# Patient Record
Sex: MALE | Race: ASIAN | Employment: FULL TIME | URBAN - METROPOLITAN AREA
[De-identification: names, ages, dates, MRNs, and addresses within clinical notes are randomized per-mention and may not be internally consistent; named-entity substitution may affect disease eponyms.]

---

## 2017-05-14 ENCOUNTER — HOSPITAL ENCOUNTER (INPATIENT)
Facility: CLINIC | Age: 57
LOS: 5 days | Discharge: HOME | DRG: 897 | End: 2017-05-19
Attending: EMERGENCY MEDICINE | Admitting: FAMILY MEDICINE
Payer: COMMERCIAL

## 2017-05-14 DIAGNOSIS — F10.239 ALCOHOL DEPENDENCE WITH WITHDRAWAL WITH COMPLICATION (H): ICD-10-CM

## 2017-05-14 PROBLEM — F19.20 CHEMICAL DEPENDENCY (H): Status: ACTIVE | Noted: 2017-05-14

## 2017-05-14 LAB
ALBUMIN SERPL-MCNC: 3.6 G/DL (ref 3.4–5)
ALBUMIN UR-MCNC: NEGATIVE MG/DL
ALP SERPL-CCNC: 123 U/L (ref 40–150)
ALT SERPL W P-5'-P-CCNC: 48 U/L (ref 0–70)
AMPHETAMINES UR QL SCN: ABNORMAL
ANION GAP SERPL CALCULATED.3IONS-SCNC: 10 MMOL/L (ref 3–14)
APPEARANCE UR: CLEAR
AST SERPL W P-5'-P-CCNC: 74 U/L (ref 0–45)
BARBITURATES UR QL: ABNORMAL
BASOPHILS # BLD AUTO: 0 10E9/L (ref 0–0.2)
BASOPHILS NFR BLD AUTO: 0.4 %
BENZODIAZ UR QL: ABNORMAL
BILIRUB SERPL-MCNC: 0.6 MG/DL (ref 0.2–1.3)
BILIRUB UR QL STRIP: NEGATIVE
BUN SERPL-MCNC: 7 MG/DL (ref 7–30)
CALCIUM SERPL-MCNC: 8.5 MG/DL (ref 8.5–10.1)
CANNABINOIDS UR QL SCN: ABNORMAL
CHLORIDE SERPL-SCNC: 104 MMOL/L (ref 94–109)
CO2 SERPL-SCNC: 26 MMOL/L (ref 20–32)
COCAINE UR QL: ABNORMAL
COLOR UR AUTO: ABNORMAL
CREAT SERPL-MCNC: 0.76 MG/DL (ref 0.66–1.25)
DIFFERENTIAL METHOD BLD: NORMAL
EOSINOPHIL # BLD AUTO: 0.1 10E9/L (ref 0–0.7)
EOSINOPHIL NFR BLD AUTO: 0.9 %
ERYTHROCYTE [DISTWIDTH] IN BLOOD BY AUTOMATED COUNT: 13.6 % (ref 10–15)
ETHANOL SERPL-MCNC: 0.13 G/DL
ETHANOL UR QL SCN: ABNORMAL
GFR SERPL CREATININE-BSD FRML MDRD: ABNORMAL ML/MIN/1.7M2
GGT SERPL-CCNC: 49 U/L (ref 0–75)
GLUCOSE SERPL-MCNC: 101 MG/DL (ref 70–99)
GLUCOSE UR STRIP-MCNC: NEGATIVE MG/DL
HCT VFR BLD AUTO: 41.5 % (ref 40–53)
HGB BLD-MCNC: 13.9 G/DL (ref 13.3–17.7)
HGB UR QL STRIP: ABNORMAL
IMM GRANULOCYTES # BLD: 0 10E9/L (ref 0–0.4)
IMM GRANULOCYTES NFR BLD: 0.1 %
KETONES UR STRIP-MCNC: 5 MG/DL
LACTATE BLD-SCNC: 3.5 MMOL/L (ref 0.7–2.1)
LEUKOCYTE ESTERASE UR QL STRIP: NEGATIVE
LIPASE SERPL-CCNC: 131 U/L (ref 73–393)
LYMPHOCYTES # BLD AUTO: 2 10E9/L (ref 0.8–5.3)
LYMPHOCYTES NFR BLD AUTO: 24.1 %
MAGNESIUM SERPL-MCNC: 2 MG/DL (ref 1.6–2.3)
MCH RBC QN AUTO: 31 PG (ref 26.5–33)
MCHC RBC AUTO-ENTMCNC: 33.5 G/DL (ref 31.5–36.5)
MCV RBC AUTO: 92 FL (ref 78–100)
MONOCYTES # BLD AUTO: 0.4 10E9/L (ref 0–1.3)
MONOCYTES NFR BLD AUTO: 5.1 %
MUCOUS THREADS #/AREA URNS LPF: PRESENT /LPF
NEUTROPHILS # BLD AUTO: 5.7 10E9/L (ref 1.6–8.3)
NEUTROPHILS NFR BLD AUTO: 69.4 %
NITRATE UR QL: NEGATIVE
NRBC # BLD AUTO: 0 10*3/UL
NRBC BLD AUTO-RTO: 0 /100
OPIATES UR QL SCN: ABNORMAL
PH UR STRIP: 7 PH (ref 5–7)
PLATELET # BLD AUTO: 187 10E9/L (ref 150–450)
POTASSIUM SERPL-SCNC: 3.2 MMOL/L (ref 3.4–5.3)
PROT SERPL-MCNC: 8.2 G/DL (ref 6.8–8.8)
RBC # BLD AUTO: 4.49 10E12/L (ref 4.4–5.9)
RBC #/AREA URNS AUTO: 1 /HPF (ref 0–2)
SODIUM SERPL-SCNC: 140 MMOL/L (ref 133–144)
SP GR UR STRIP: 1.01 (ref 1–1.03)
URN SPEC COLLECT METH UR: ABNORMAL
UROBILINOGEN UR STRIP-MCNC: NORMAL MG/DL (ref 0–2)
WBC # BLD AUTO: 8.2 10E9/L (ref 4–11)
WBC #/AREA URNS AUTO: 1 /HPF (ref 0–2)

## 2017-05-14 PROCEDURE — 80320 DRUG SCREEN QUANTALCOHOLS: CPT | Performed by: EMERGENCY MEDICINE

## 2017-05-14 PROCEDURE — 25000132 ZZH RX MED GY IP 250 OP 250 PS 637: Performed by: EMERGENCY MEDICINE

## 2017-05-14 PROCEDURE — 25000132 ZZH RX MED GY IP 250 OP 250 PS 637: Performed by: DERMATOLOGY

## 2017-05-14 PROCEDURE — 83605 ASSAY OF LACTIC ACID: CPT | Performed by: EMERGENCY MEDICINE

## 2017-05-14 PROCEDURE — 85025 COMPLETE CBC W/AUTO DIFF WBC: CPT | Performed by: EMERGENCY MEDICINE

## 2017-05-14 PROCEDURE — HZ2ZZZZ DETOXIFICATION SERVICES FOR SUBSTANCE ABUSE TREATMENT: ICD-10-PCS | Performed by: FAMILY MEDICINE

## 2017-05-14 PROCEDURE — 99292 CRITICAL CARE ADDL 30 MIN: CPT | Mod: Z6 | Performed by: EMERGENCY MEDICINE

## 2017-05-14 PROCEDURE — 25000128 H RX IP 250 OP 636: Performed by: EMERGENCY MEDICINE

## 2017-05-14 PROCEDURE — 99291 CRITICAL CARE FIRST HOUR: CPT | Mod: Z6 | Performed by: EMERGENCY MEDICINE

## 2017-05-14 PROCEDURE — 81001 URINALYSIS AUTO W/SCOPE: CPT | Performed by: EMERGENCY MEDICINE

## 2017-05-14 PROCEDURE — 83690 ASSAY OF LIPASE: CPT | Performed by: EMERGENCY MEDICINE

## 2017-05-14 PROCEDURE — 80053 COMPREHEN METABOLIC PANEL: CPT | Performed by: EMERGENCY MEDICINE

## 2017-05-14 PROCEDURE — 99285 EMERGENCY DEPT VISIT HI MDM: CPT | Mod: 25 | Performed by: EMERGENCY MEDICINE

## 2017-05-14 PROCEDURE — 96360 HYDRATION IV INFUSION INIT: CPT | Performed by: EMERGENCY MEDICINE

## 2017-05-14 PROCEDURE — 96361 HYDRATE IV INFUSION ADD-ON: CPT | Performed by: EMERGENCY MEDICINE

## 2017-05-14 PROCEDURE — 82977 ASSAY OF GGT: CPT | Performed by: EMERGENCY MEDICINE

## 2017-05-14 PROCEDURE — 12800008 ZZH R&B CD ADULT

## 2017-05-14 PROCEDURE — 83735 ASSAY OF MAGNESIUM: CPT | Performed by: EMERGENCY MEDICINE

## 2017-05-14 PROCEDURE — 80307 DRUG TEST PRSMV CHEM ANLYZR: CPT | Performed by: EMERGENCY MEDICINE

## 2017-05-14 PROCEDURE — 25000132 ZZH RX MED GY IP 250 OP 250 PS 637: Performed by: PSYCHIATRY & NEUROLOGY

## 2017-05-14 PROCEDURE — 25000125 ZZHC RX 250: Performed by: EMERGENCY MEDICINE

## 2017-05-14 RX ORDER — HYDRALAZINE HCL 10 MG
10 TABLET ORAL EVERY 6 HOURS PRN
Status: DISCONTINUED | OUTPATIENT
Start: 2017-05-14 | End: 2017-05-19 | Stop reason: HOSPADM

## 2017-05-14 RX ORDER — FOLIC ACID 1 MG/1
1 TABLET ORAL DAILY
Status: DISCONTINUED | OUTPATIENT
Start: 2017-05-15 | End: 2017-05-19 | Stop reason: HOSPADM

## 2017-05-14 RX ORDER — LANOLIN ALCOHOL/MO/W.PET/CERES
100 CREAM (GRAM) TOPICAL DAILY
Status: COMPLETED | OUTPATIENT
Start: 2017-05-15 | End: 2017-05-17

## 2017-05-14 RX ORDER — HYDROXYZINE HYDROCHLORIDE 25 MG/1
25-50 TABLET, FILM COATED ORAL EVERY 4 HOURS PRN
Status: DISCONTINUED | OUTPATIENT
Start: 2017-05-14 | End: 2017-05-19 | Stop reason: HOSPADM

## 2017-05-14 RX ORDER — TRAZODONE HYDROCHLORIDE 50 MG/1
50 TABLET, FILM COATED ORAL
Status: DISCONTINUED | OUTPATIENT
Start: 2017-05-14 | End: 2017-05-19 | Stop reason: HOSPADM

## 2017-05-14 RX ORDER — BISACODYL 10 MG
10 SUPPOSITORY, RECTAL RECTAL DAILY PRN
Status: DISCONTINUED | OUTPATIENT
Start: 2017-05-14 | End: 2017-05-19 | Stop reason: HOSPADM

## 2017-05-14 RX ORDER — POTASSIUM CHLORIDE 1.5 G/1.58G
40 POWDER, FOR SOLUTION ORAL ONCE
Status: COMPLETED | OUTPATIENT
Start: 2017-05-14 | End: 2017-05-14

## 2017-05-14 RX ORDER — ACETAMINOPHEN 325 MG/1
650 TABLET ORAL EVERY 4 HOURS PRN
Status: DISCONTINUED | OUTPATIENT
Start: 2017-05-14 | End: 2017-05-19 | Stop reason: HOSPADM

## 2017-05-14 RX ORDER — DIAZEPAM 5 MG
5-20 TABLET ORAL EVERY 30 MIN PRN
Status: DISCONTINUED | OUTPATIENT
Start: 2017-05-14 | End: 2017-05-19 | Stop reason: HOSPADM

## 2017-05-14 RX ORDER — LORAZEPAM 0.5 MG/1
2 TABLET ORAL ONCE
Status: COMPLETED | OUTPATIENT
Start: 2017-05-14 | End: 2017-05-14

## 2017-05-14 RX ORDER — MULTIPLE VITAMINS W/ MINERALS TAB 9MG-400MCG
1 TAB ORAL DAILY
Status: DISCONTINUED | OUTPATIENT
Start: 2017-05-15 | End: 2017-05-19 | Stop reason: HOSPADM

## 2017-05-14 RX ORDER — ALUMINA, MAGNESIA, AND SIMETHICONE 2400; 2400; 240 MG/30ML; MG/30ML; MG/30ML
30 SUSPENSION ORAL EVERY 4 HOURS PRN
Status: DISCONTINUED | OUTPATIENT
Start: 2017-05-14 | End: 2017-05-19 | Stop reason: HOSPADM

## 2017-05-14 RX ORDER — LOPERAMIDE HCL 2 MG
2 CAPSULE ORAL 4 TIMES DAILY PRN
Status: DISCONTINUED | OUTPATIENT
Start: 2017-05-14 | End: 2017-05-19 | Stop reason: HOSPADM

## 2017-05-14 RX ORDER — SODIUM CHLORIDE 9 MG/ML
1000 INJECTION, SOLUTION INTRAVENOUS CONTINUOUS
Status: DISCONTINUED | OUTPATIENT
Start: 2017-05-14 | End: 2017-05-14

## 2017-05-14 RX ADMIN — SODIUM CHLORIDE 1000 ML: 900 INJECTION, SOLUTION INTRAVENOUS at 08:55

## 2017-05-14 RX ADMIN — DIAZEPAM 10 MG: 5 TABLET ORAL at 15:21

## 2017-05-14 RX ADMIN — POTASSIUM CHLORIDE 40 MEQ: 1.5 POWDER, FOR SOLUTION ORAL at 11:02

## 2017-05-14 RX ADMIN — LORAZEPAM 2 MG: 0.5 TABLET ORAL at 10:55

## 2017-05-14 RX ADMIN — SODIUM CHLORIDE 1000 ML: 900 INJECTION, SOLUTION INTRAVENOUS at 11:58

## 2017-05-14 RX ADMIN — SODIUM CHLORIDE 1000 ML: 900 INJECTION, SOLUTION INTRAVENOUS at 10:24

## 2017-05-14 RX ADMIN — Medication 10 MG: at 21:47

## 2017-05-14 RX ADMIN — FOLIC ACID: 5 INJECTION, SOLUTION INTRAMUSCULAR; INTRAVENOUS; SUBCUTANEOUS at 11:02

## 2017-05-14 RX ADMIN — DIAZEPAM 10 MG: 5 TABLET ORAL at 20:09

## 2017-05-14 RX ADMIN — LORAZEPAM 2 MG: 0.5 TABLET ORAL at 09:29

## 2017-05-14 ASSESSMENT — ENCOUNTER SYMPTOMS
COLOR CHANGE: 0
PALPITATIONS: 1
NAUSEA: 1
ARTHRALGIAS: 0
DIFFICULTY URINATING: 0
TREMORS: 1
SHORTNESS OF BREATH: 0
HEADACHES: 0
NECK STIFFNESS: 0
FEVER: 0
EYE REDNESS: 0
ABDOMINAL PAIN: 0
CONFUSION: 0

## 2017-05-14 ASSESSMENT — ACTIVITIES OF DAILY LIVING (ADL)
GROOMING: INDEPENDENT
ORAL_HYGIENE: INDEPENDENT
DRESS: INDEPENDENT
GROOMING: INDEPENDENT

## 2017-05-14 NOTE — IP AVS SNAPSHOT
" [facility]: [phone]                                              INTERAGENCY TRANSFER FORM - LAB / IMAGING / EKG / EMG RESULTS   2017                    Hospital Admission Date: 2017  BOUCHRA NORWOOD   : 1960  Sex: Male        Attending Provider: Immanuel Car MD     Allergies:  No Known Allergies    Infection:  None   Service:  CHEMICAL DEP    Ht:  1.702 m (5' 7\")   Wt:  72.6 kg (160 lb)   Admission Wt:  72.6 kg (160 lb)    BMI:  25.06 kg/m 2   BSA:  1.85 m 2            Patient PCP Information     Provider PCP Type    Physician No Ref-Primary General      Unresulted Labs     None      Encounter-Level Documents:     There are no encounter-level documents.      Order-Level Documents:     There are no order-level documents.      "

## 2017-05-14 NOTE — ED NOTES
"ED TRIAGE    Medical / Trauma C/o:  56-yr male patient - presenting to ED via EMS ([facility]); from his residence; exhibiting S/s of ETOH w/d - d/t's:  Tachy, HTN, nausea, tremulous, thirst/dehydration, anxiety.  States his last ETOH:  Two days ago.  English is limited.    Duration of C/o:  Since yesterday    Contributing Factors / Concerning HX:  Previous ETOH issues    Significant Med's / Tx's:  See med's    Febrile / Afebrile:  Afebrile    Patient Vitals for the past 24 hrs:   BP Temp Temp src Pulse Heart Rate Resp SpO2 Height Weight   05/14/17 0734 (!) 164/100 98.8  F (37.1  C) Oral 108 108 22 100 % 1.702 m (5' 7\") 72.6 kg (160 lb)       Raul Skinner  May 14, 2017  7:53 AM    "

## 2017-05-14 NOTE — PROGRESS NOTES
05/14/17 1427   Patient Belongings   Did you bring any home meds/supplements to the hospital?  No   Patient Belongings clothing   Disposition of Belongings see note   Belongings Search Yes     Shoes, coat in storage    Lighter, smokeless tobacco, ear buds in sharps    Cell, , wallet, waist belt, 2 necklaces in locked drawer    $8.00 cash, 2 medical cards, Visa in security    ADMISSION:  I am responsible for any personal items that are not sent to the safe or pharmacy. [facility] is not responsible for loss, theft or damage of any property in my possession.    Patient Signature _____________________ Date/Time _____________________    Staff Signature _______________________ Date/Time _____________________    2nd Staff person, if patient is unable/unwilling to sign  ___________________________________ Date/Time _____________________  DISCHARGE:  All personal items have been returned to me.    Patient Signature _____________________ Date/Time _____________________    Staff Signature _______________________ Date/Time _____________________

## 2017-05-14 NOTE — ED PROVIDER NOTES
History     Chief Complaint   Patient presents with     Delirium Tremens (DTS)     ETOH w/d; last ETOH:  2 days ago     BEATRIZ Chou is a 56 year old male with a history of alcohol abuse who presents to the Emergency Department via EMS for evaluation of an addiction problem. The patient reports drinking whiskey daily with his last drink last night. In the mornings, he does develop tremors, which he developed today as well as palpitations, myalgias and nausea, prompting him to call EMS. He denies any other concerns or complaints at this time.    History reviewed. No pertinent past medical history.    History reviewed. No pertinent surgical history.    No family history on file.    Social History   Substance Use Topics     Smoking status: Unknown If Ever Smoked     Smokeless tobacco: Not on file     Alcohol use Yes       Current Facility-Administered Medications   Medication     0.9% sodium chloride infusion     hydrOXYzine (ATARAX) tablet 25-50 mg     diazepam (VALIUM) tablet 5-20 mg     [START ON 5/15/2017] thiamine tablet 100 mg     [START ON 5/15/2017] folic acid (FOLVITE) tablet 1 mg     [START ON 5/15/2017] multivitamin, therapeutic with minerals (THERA-VIT-M) tablet 1 tablet     nicotine Patch in Place     [START ON 5/15/2017] nicotine patch REMOVAL     acetaminophen (TYLENOL) tablet 650 mg     alum & mag hydroxide-simethicone (MYLANTA ES/MAALOX  ES) suspension 30 mL     magnesium hydroxide (MILK OF MAGNESIA) suspension 30 mL     bisacodyl (DULCOLAX) Suppository 10 mg     traZODone (DESYREL) tablet 50 mg      No Known Allergies    I have reviewed the Medications, Allergies, Past Medical and Surgical History, and Social History in the Epic system.    Review of Systems   Constitutional: Negative for fever.   HENT: Negative for congestion.    Eyes: Negative for redness.   Respiratory: Negative for shortness of breath.    Cardiovascular: Positive for palpitations. Negative for chest pain.  "  Gastrointestinal: Positive for nausea. Negative for abdominal pain.   Genitourinary: Negative for difficulty urinating.   Musculoskeletal: Negative for arthralgias and neck stiffness.   Skin: Negative for color change.   Neurological: Positive for tremors. Negative for headaches.   Psychiatric/Behavioral: Negative for confusion.        Positive for alcohol use.   All other systems reviewed and are negative.      Physical Exam   BP: (!) 164/100  Pulse: 108  Heart Rate: 108  Temp: 98.8  F (37.1  C)  Resp: 22  Height: 170.2 cm (5' 7\")  Weight: 72.6 kg (160 lb)  SpO2: 100 %  Physical Exam   Constitutional: No distress.   HENT:   Head: Atraumatic.   Mouth/Throat: Oropharynx is clear and moist. No oropharyngeal exudate.   Eyes: Pupils are equal, round, and reactive to light. No scleral icterus.   Cardiovascular: Normal heart sounds and intact distal pulses.    Pulmonary/Chest: Breath sounds normal. No respiratory distress.   Abdominal: Soft. Bowel sounds are normal. There is no tenderness.   Musculoskeletal: He exhibits no edema or tenderness.   Neurological: He displays tremor.   Skin: Skin is warm. No rash noted. He is not diaphoretic.       ED Course     8:27 AM  The patient was seen and examined by Dr. Blount in Room 3.     ED Course     Procedures          Critical Care time:  was 75 minutes for this patient excluding procedures.  Results for orders placed or performed during the hospital encounter of 05/14/17   CBC with platelets differential   Result Value Ref Range    WBC 8.2 4.0 - 11.0 10e9/L    RBC Count 4.49 4.4 - 5.9 10e12/L    Hemoglobin 13.9 13.3 - 17.7 g/dL    Hematocrit 41.5 40.0 - 53.0 %    MCV 92 78 - 100 fl    MCH 31.0 26.5 - 33.0 pg    MCHC 33.5 31.5 - 36.5 g/dL    RDW 13.6 10.0 - 15.0 %    Platelet Count 187 150 - 450 10e9/L    Diff Method Automated Method     % Neutrophils 69.4 %    % Lymphocytes 24.1 %    % Monocytes 5.1 %    % Eosinophils 0.9 %    % Basophils 0.4 %    % Immature Granulocytes 0.1 % "    Nucleated RBCs 0 0 /100    Absolute Neutrophil 5.7 1.6 - 8.3 10e9/L    Absolute Lymphocytes 2.0 0.8 - 5.3 10e9/L    Absolute Monocytes 0.4 0.0 - 1.3 10e9/L    Absolute Eosinophils 0.1 0.0 - 0.7 10e9/L    Absolute Basophils 0.0 0.0 - 0.2 10e9/L    Abs Immature Granulocytes 0.0 0 - 0.4 10e9/L    Absolute Nucleated RBC 0.0    Comprehensive metabolic panel   Result Value Ref Range    Sodium 140 133 - 144 mmol/L    Potassium 3.2 (L) 3.4 - 5.3 mmol/L    Chloride 104 94 - 109 mmol/L    Carbon Dioxide 26 20 - 32 mmol/L    Anion Gap 11 3 - 14 mmol/L    Glucose 101 (H) 70 - 99 mg/dL    Urea Nitrogen 7 7 - 30 mg/dL    Creatinine 0.76 0.66 - 1.25 mg/dL    GFR Estimate >90  Non  GFR Calc   >60 mL/min/1.7m2    GFR Estimate If Black >90   GFR Calc   >60 mL/min/1.7m2    Calcium 8.5 8.5 - 10.1 mg/dL    Bilirubin Total 0.6 0.2 - 1.3 mg/dL    Albumin 3.6 3.4 - 5.0 g/dL    Protein Total 8.2 6.8 - 8.8 g/dL    Alkaline Phosphatase 123 40 - 150 U/L    ALT 48 0 - 70 U/L    AST 74 (H) 0 - 45 U/L   Lactic acid   Result Value Ref Range    Lactic Acid 3.5 (H) 0.7 - 2.1 mmol/L   Lipase   Result Value Ref Range    Lipase 131 73 - 393 U/L   Drug abuse screen 6 urine (tox)   Result Value Ref Range    Amphetamine Qual Urine  NEG     Negative   Cutoff for a negative amphetamine is 500 ng/mL or less.      Barbiturates Qual Urine  NEG     Negative   Cutoff for a negative barbiturate is 200 ng/mL or less.      Benzodiazepine Qual Urine  NEG     Negative   Cutoff for a negative benzodiazepine is 200 ng/mL or less.      Cannabinoids Qual Urine  NEG     Negative   Cutoff for a negative cannabinoid is 50 ng/mL or less.      Cocaine Qual Urine  NEG     Negative   Cutoff for a negative cocaine is 300 ng/mL or less.      Ethanol Qual Urine (A) NEG     Positive   Cutoff for a positive urine ethanol is greater than 0.05 g/dL.  This is an   unconfirmed screening result to be used for medical purposes only.      Opiates  Qualitative Urine  NEG     Negative   Cutoff for a negative opiate is 300 ng/mL or less.     UA with Microscopic reflex to Culture   Result Value Ref Range    Color Urine Light Yellow     Appearance Urine Clear     Glucose Urine Negative NEG mg/dL    Bilirubin Urine Negative NEG    Ketones Urine 5 (A) NEG mg/dL    Specific Gravity Urine 1.008 1.003 - 1.035    Blood Urine Trace (A) NEG    pH Urine 7.0 5.0 - 7.0 pH    Protein Albumin Urine Negative NEG mg/dL    Urobilinogen mg/dL Normal 0.0 - 2.0 mg/dL    Nitrite Urine Negative NEG    Leukocyte Esterase Urine Negative NEG    Source Unspecified Urine     WBC Urine 1 0 - 2 /HPF    RBC Urine 1 0 - 2 /HPF    Mucous Urine Present (A) NEG /LPF   Alcohol   Result Value Ref Range    Ethanol g/dL 0.13 (H) <0.01 g/dL   Magnesium   Result Value Ref Range    Magnesium 2.0 1.6 - 2.3 mg/dL     Medications   0.9% sodium chloride BOLUS (1,000 mLs Intravenous New Bag 5/14/17 1158)     Followed by   0.9% sodium chloride infusion (not administered)   hydrOXYzine (ATARAX) tablet 25-50 mg (not administered)   diazepam (VALIUM) tablet 5-20 mg (not administered)   thiamine tablet 100 mg (not administered)   folic acid (FOLVITE) tablet 1 mg (not administered)   multivitamin, therapeutic with minerals (THERA-VIT-M) tablet 1 tablet (not administered)   nicotine Patch in Place (not administered)   nicotine patch REMOVAL (not administered)   acetaminophen (TYLENOL) tablet 650 mg (not administered)   alum & mag hydroxide-simethicone (MYLANTA ES/MAALOX  ES) suspension 30 mL (not administered)   magnesium hydroxide (MILK OF MAGNESIA) suspension 30 mL (not administered)   bisacodyl (DULCOLAX) Suppository 10 mg (not administered)   traZODone (DESYREL) tablet 50 mg (not administered)   0.9% sodium chloride BOLUS (0 mLs Intravenous Stopped 5/14/17 1022)   LORazepam (ATIVAN) tablet 2 mg (2 mg Oral Given 5/14/17 0929)   0.9% sodium chloride BOLUS (0 mLs Intravenous Stopped 5/14/17 1100)   NaCl 0.9 %  1,000 mL with multivitamin-ADULT (INFUVITE) 10 mL, thiamine 100 mg, folic acid 1 mg, magnesium sulfate 2 g infusion ( Intravenous Stopped 5/14/17 1149)   LORazepam (ATIVAN) tablet 2 mg (2 mg Oral Given 5/14/17 1055)   potassium chloride (KLOR-CON) Packet 40 mEq (40 mEq Oral Given 5/14/17 1102)            Labs Ordered and Resulted from Time of ED Arrival Up to the Time of Departure from the ED   COMPREHENSIVE METABOLIC PANEL - Abnormal; Notable for the following:        Result Value    Potassium 3.2 (*)     Glucose 101 (*)     AST 74 (*)     All other components within normal limits   LACTIC ACID WHOLE BLOOD - Abnormal; Notable for the following:     Lactic Acid 3.5 (*)     All other components within normal limits   DRUG ABUSE SCREEN 6 CHEM DEP URINE ([facility]) - Abnormal; Notable for the following:     Ethanol Qual Urine   (*)     Value: Positive   Cutoff for a positive urine ethanol is greater than 0.05 g/dL.  This is an   unconfirmed screening result to be used for medical purposes only.      All other components within normal limits   ROUTINE UA WITH MICROSCOPIC REFLEX TO CULTURE - Abnormal; Notable for the following:     Ketones Urine 5 (*)     Blood Urine Trace (*)     Mucous Urine Present (*)     All other components within normal limits   ALCOHOL ETHYL - Abnormal; Notable for the following:     Ethanol g/dL 0.13 (*)     All other components within normal limits   CBC WITH PLATELETS DIFFERENTIAL   LIPASE   MAGNESIUM   PULSE OXIMETRY NURSING   CARDIAC CONTINUOUS MONITORING   ENCOURAGE FLUIDS            Assessments & Plan (with Medical Decision Making)   56-year-old male presents for evaluation of feeling poorly, tremors, nausea following a 2 week binge of large amounts of whiskey.  His exam revealed that he was tachycardic, hypertensive, diaphoretic and tremulous.  Laboratories revealed elevated lactic acid within the setting of hypovolemia as well as low potassium in the setting of vomiting and slightly elevated  AST consistent with ongoing alcohol abuse.  His ethanol level was 0.13.  Patient was treated with several liters of IV saline in addition to multivitamin, magnesium, thiamine, folate and several doses of Ativan.  Patient felt improved but continued to have tremulousness.  He is at risk for DTs and possible seizure.  He was agreeable to detox and after several hours of treatment was transferred to detox for further evaluation and management.    I have reviewed the nursing notes.    I have reviewed the findings, diagnosis, plan and need for follow up with the patient.    There are no discharge medications for this patient.      Final diagnoses:   Alcohol dependence with withdrawal with complication (H)     Annalise DENTON, am serving as a trained medical scribe to document services personally performed by Mike Blount MD, based on the provider's statements to me.      Mike DENTON MD, was physically present and have reviewed and verified the accuracy of this note documented by Annalise Guerrero.     5/14/2017   [facility], [place], EMERGENCY DEPARTMENT     Mike Blount MD  05/14/17 6571

## 2017-05-14 NOTE — IP AVS SNAPSHOT
[facility]    [address] [facility] [address]    Phone:  [phone]                                       After Visit Summary   5/14/2017    Orlando Chou    MRN: [id number]           After Visit Summary Signature Page     I have received my discharge instructions, and my questions have been answered. I have discussed any challenges I see with this plan with the nurse or doctor.    ..........................................................................................................................................  Patient/Patient Representative Signature      ..........................................................................................................................................  Patient Representative Print Name and Relationship to Patient    ..................................................               ................................................  Date                                            Time    ..........................................................................................................................................  Reviewed by Signature/Title    ...................................................              ..............................................  Date                                                            Time

## 2017-05-14 NOTE — PLAN OF CARE
Problem: Substance Withdrawal  Goal: Substance Withdrawal  Signs and symptoms of listed problems will be absent or manageable.         Pt presents to [facility] for detox from alcohol. Pt reports he relapsed 2 weeks ago and has been drinking whiskey continuously with last use this AM. Pt wishes to detox and return home. Pt denies SI/SIB. Pt is a refugee from [facility] and has a very limited ability to speak English. Tibetan is his language of choice. The phone line  service was used for the admission process.To reach this service, dial [phone], option #3 then option #0. [facility] Mssa upon arrival = 10. Pt will be monitored for signs and symptoms of withdrawal and treated with appropriate protocols.

## 2017-05-14 NOTE — PLAN OF CARE
Problem: Fall Risk (Adult)  Goal: Identify Related Risk Factors and Signs and Symptoms  Related risk factors and signs and symptoms are identified upon initiation of Human Response Clinical Practice Guideline (CPG)  Pt provided a wrist band call light and explained with Jacinto .  Instructed to call for any unsteadiness or balance problems when trying to ambulate, including going to the bathroom.  Pt verbalizes understanding.

## 2017-05-14 NOTE — ED NOTES
56-yr male in ED for eval of ETOH-binge for 10-12 days; last day of ETOH intake:  Friday, 2 days ago, per patient.  Patient arrived trmulous, cool/clammy, tachy, anxious, nauseated, hypertensive; now is feeling better after adjunct tx.  Will continue fluids (IV/PO) and await disposition.

## 2017-05-14 NOTE — ED NOTES
Report called to [facility] - [place]; Admit bed is ready; transport called for:  [facility] - EMS transport will arrive at approx. ~1335pm for transport.  Patient aware of admit.

## 2017-05-14 NOTE — IP AVS SNAPSHOT
MRN:[id number]                      After Visit Summary   5/14/2017    Orlando Chou    MRN: [id number]           Thank you!     Thank you for choosing [facility] for your care. Our goal is always to provide you with excellent care.        Patient Information     Date Of Birth          1960        Designated Caregiver       Most Recent Value    Caregiver    Will someone help with your care after discharge? no      About your hospital stay     You were admitted on:  May 14, 2017 You last received care in the:  [facility]    You were discharged on:  May 19, 2017       Who to Call     For medical emergencies, please call 911.  For non-urgent questions about your medical care, please call your primary care provider or clinic, None          Attending Provider     Provider Specialty    Mike Blount MD Emergency Medicine    Amer, Immanuel Zelaya MD Family Practice       Primary Care Provider    Physician No Ref-Primary       No address on file        Your next 10 appointments already scheduled     Jun 16, 2017  9:00 AM CDT   SHORT with Jazmin Finn,    [facility]    [address] [place] [id number] [phone]              Further instructions from your care team       Behavioral Discharge Planning and Instructions  THANK YOU FOR CHOOSING THE [address]  [phone]    Summary: You were admitted to [facility] on 5/14/2017 for detoxification from Alcohol.  A medical exam was performed that included lab work. You have met with a  and opted to discharge to [facility] for shelter.  Please take care and make your recovery a priority, Orlando!     You have an appointment at [facility] on Monday 5/22/17 @ 6 pm.  Be sure to attend this appointment.    [facility], [address]  [place] [address]  [phone]    Main Diagnoses:  Per   Amer;   Alcohol use disorder     Major Treatments, Procedures and Findings:  You were treated for Alcohol detoxification using Valium administered based on the [facility] protocol.   You have had labs drawn and those results have been reviewed with you. Please take a copy of your lab work with you to your next primary care physician appointment.    Symptoms to Report:  If you experience more anxiety, confusion, sleeplessness, deep sadness or thoughts of suicide, notify your treatment team or notify your primary care physician. IF ANY OF THE SYMPTOMS YOU ARE EXPERIENCING ARE A MEDICAL EMERGENCY CALL 911 IMMEDIATELY.     Lifestyle Adjustment: Adjust your lifestyle to get enough sleep, relaxation, exercise and good nutrition. Continue to develop healthy coping skills to decrease stress and promote a sober living environment. Do not use mood altering substances including alcohol, illegal drugs or addictive medications other than what is currently prescribed.     Follow-up Appointment:     Jun 16, 2017  9:00 AM CDT   FELICITY with Jazmin Finn DO   [facility]    [address] [place] [id number] [phone]        Resources:   http://www.aastpaul.org/?topic=8  http://a[place].org/meetings/  http://www.nam[place].org/index.php/meeting-list-pdf  http://www.harmreduction.org  [facility] 940-620-7731  Support Group:  PRICILLA/VIDA and Sponsor/support  Crisis Intervention: 202.441.8674 or 889-758-2451 (TTY: 631.577.5116).  Call anytime for help.  National [place] on Mental Illness (www.[place].shan.org): 804.441.9600 or 744-946-9914.  Alcoholics Anonymous (www.alcoholics-anonymous.org): Check your phone book for your local chapter.  Suicide Awareness Voices of Education (SAVE) (www.save.org): 592-467-VPHO (7645)  National Suicide Prevention Line (www.mentalhealthmn.org): 050-731-NPUK (4555)  Mental Health Consumer/Survivor Network of [place] (www.mhcsn.net):  977-037-4639 or 692-035-5696  Mental Health Association of [place] (www.mentalhealth.org): 808.859.8036 or 725-193-2927   Substance Abuse and Mental Health Services (www.samhsa.gov)    [place] [facility] ([facility])  [facility] connects people seeking recovery to resources that help foster and sustain long-term recovery.  Whether you are seeking resources for treatment, transportation, housing, job training, education, health care or other pathways to recovery, [facility] is a great place to start.  489.812.7537.  www.[facility].org    General Medication Instructions:   See your medication sheet(s) for instructions.   Take all medicines as directed.  Make no changes unless your doctor suggests them.   Go to all your doctor visits.  Be sure to have all your required lab tests. This way, your medicines can be refilled on time.  AA/NA and Sponsors are excellent resources for support and you can find one at any support group meeting.  Do not use any form of alcohol.    Please Note:  If you have any questions at anytime after you are discharged please call the [facility] detox unit 3AW unit at [phone].  [facility], Behavioral Intake [phone]  Please take this discharge folder with you to all your follow up appointments, it contains your lab results, diagnosis, medication list and discharge recommendations.      THANK YOU FOR CHOOSING THE [facility]       Pending Results     No orders found from 5/12/2017 to 5/15/2017.            Statement of Approval     Ordered          05/19/17 0809  I have reviewed and agree with all the recommendations and orders detailed in this document.  EFFECTIVE NOW     Approved and electronically signed by:  Immanuel Car MD           05/17/17 0837  I have reviewed and agree with all the recommendations and orders detailed in this document.  EFFECTIVE NOW     Approved and electronically signed by:  Joceline  "Immanuel Zelaya MD             Admission Information     Date & Time Provider Department Dept. Phone    2017 Immanuel Car MD [facility] [phone]      Your Vitals Were     Blood Pressure Pulse Temperature Respirations Height Weight    134/85 (BP Location: Left arm) 107 97.2  F (36.2  C) (Oral) 16 1.702 m (5' 7\") 72.6 kg (160 lb)    Pulse Oximetry BMI (Body Mass Index)                100% 25.06 kg/m2          [employer] Information     [employer] lets you send messages to your doctor, view your test results, renew your prescriptions, schedule appointments and more. To sign up, go to [email or web address] . Click on \"Log in\" on the left side of the screen, which will take you to the Welcome page. Then click on \"Sign up Now\" on the right side of the page.     You will be asked to enter the access code listed below, as well as some personal information. Please follow the directions to create your username and password.     Your access code is: [email or web address]  Expires: 8/15/2017  7:35 AM     Your access code will  in 90 days. If you need help or a new code, please call your [facility] clinic or [phone].        Care EveryWhere ID     This is your Care EveryWhere ID. This could be used by other organizations to access your [facility] medical records  [id number]           Review of your medicines      Notice     You have not been prescribed any medications.             Protect others around you: Learn how to safely use, store and throw away your medicines at www.disposemymeds.org.             Medication List: This is a list of all your medications and when to take them. Check marks below indicate your daily home schedule. Keep this list as a reference.      Notice     You have not been prescribed any medications.      "

## 2017-05-15 ENCOUNTER — APPOINTMENT (OUTPATIENT)
Dept: GENERAL RADIOLOGY | Facility: CLINIC | Age: 57
DRG: 897 | End: 2017-05-15
Attending: FAMILY MEDICINE
Payer: COMMERCIAL

## 2017-05-15 PROCEDURE — 25000132 ZZH RX MED GY IP 250 OP 250 PS 637: Performed by: FAMILY MEDICINE

## 2017-05-15 PROCEDURE — 25000132 ZZH RX MED GY IP 250 OP 250 PS 637: Performed by: PSYCHIATRY & NEUROLOGY

## 2017-05-15 PROCEDURE — 71020 XR CHEST 2 VW: CPT

## 2017-05-15 PROCEDURE — 12800008 ZZH R&B CD ADULT

## 2017-05-15 PROCEDURE — 99222 1ST HOSP IP/OBS MODERATE 55: CPT | Mod: AI | Performed by: FAMILY MEDICINE

## 2017-05-15 RX ORDER — CLONIDINE HYDROCHLORIDE 0.2 MG/1
0.2 TABLET ORAL 2 TIMES DAILY
Status: DISCONTINUED | OUTPATIENT
Start: 2017-05-15 | End: 2017-05-17

## 2017-05-15 RX ORDER — MENTHOL
LOZENGE MUCOUS MEMBRANE 2 TIMES DAILY PRN
Status: DISCONTINUED | OUTPATIENT
Start: 2017-05-15 | End: 2017-05-19 | Stop reason: HOSPADM

## 2017-05-15 RX ADMIN — DIAZEPAM 10 MG: 5 TABLET ORAL at 04:48

## 2017-05-15 RX ADMIN — Medication 100 MG: at 09:02

## 2017-05-15 RX ADMIN — Medication 1 LOZENGE: at 18:59

## 2017-05-15 RX ADMIN — Medication: at 13:52

## 2017-05-15 RX ADMIN — NICOTINE POLACRILEX 8 MG: 4 GUM, CHEWING ORAL at 11:54

## 2017-05-15 RX ADMIN — DIAZEPAM 10 MG: 5 TABLET ORAL at 11:54

## 2017-05-15 RX ADMIN — DIAZEPAM 10 MG: 5 TABLET ORAL at 09:02

## 2017-05-15 RX ADMIN — CLONIDINE HYDROCHLORIDE 0.2 MG: 0.2 TABLET ORAL at 20:48

## 2017-05-15 RX ADMIN — CLONIDINE HYDROCHLORIDE 0.2 MG: 0.2 TABLET ORAL at 09:02

## 2017-05-15 RX ADMIN — Medication 1 LOZENGE: at 20:49

## 2017-05-15 RX ADMIN — NICOTINE 1 PATCH: 7 PATCH TRANSDERMAL at 18:59

## 2017-05-15 RX ADMIN — FOLIC ACID 1 MG: 1 TABLET ORAL at 09:02

## 2017-05-15 RX ADMIN — MULTIPLE VITAMINS W/ MINERALS TAB 1 TABLET: TAB at 09:02

## 2017-05-15 NOTE — PROGRESS NOTES
Ticket 2 ride completed for CXR, pt escorted by staff member Garcia LOPEZ during the entire off unit procedure and escorted via wheelchair. Total time off unit about 30 minutes.

## 2017-05-15 NOTE — PROGRESS NOTES
"CLINICAL NUTRITION SERVICES - ASSESSMENT NOTE     Nutrition Prescription    RECOMMENDATIONS FOR MDs/PROVIDERS TO ORDER:  Recommend replacing potassium, given low value of 3.2 on 5/14      Malnutrition Status:    Patient does not meet two of the criteria necessary for diagnosing malnutrition    Recommendations already ordered by Registered Dietitian (RD):  None at this time.     Future/Additional Recommendations:  1. Monitor PO intake. Encourage PO intake of meals TID and snacks.      REASON FOR ASSESSMENT  Orlando hCou is a/an 56 year old male assessed by the dietitian for Admission Nutrition Risk Screen for reduced oral intake over the last month    NUTRITION HISTORY  Information obtained from the pt, using an     Per pt, he follows a regular diet at home. He reports his appetite has been poor since he started drinking again about 15 days ago and has not been eating as much. He states he was consuming no more than 2 meals per day.     Per H&P, pt was drinking whiskey daily, last drink was last night.  Currently feeling poorly, tremors, and nausea following a 2 week binge of large amounts of whiskey.     CURRENT NUTRITION ORDERS  Diet: Regular  Intake/Tolerance: Per discussion with pt, he states he is feeling much better, his tremors and nausea are decreasing. He estimates his appetite will return as he starts feeling better.     LABS  Labs reviewed  K+ 3.2 (L) - currently with no replacement ordered     MEDICATIONS  Medications reviewed    ANTHROPOMETRICS  Height: 170.2 cm (5' 7\")  Most Recent Weight: 72.6 kg (160 lb)    IBW: 67.3 kg (148#)  BMI: Overweight BMI 25-29.9  Weight History: Difficult to assess weight trends given lack of weight records per data below. Per pt, he doesn't think he has noticed any recent weight loss and estimates his UBW is around 160#, comparable to his admission weight.   Wt Readings from Last 10 Encounters:   05/14/17 72.6 kg (160 lb)   01/19/16 73.5 kg (162 lb)     Dosing " Weight: 73 kg - based on current/admit wt of 72.6 kg on 5/14    ASSESSED NUTRITION NEEDS  Estimated Energy Needs: 9259-3261 kcals/day (25 - 30 kcals/kg)  Justification: Maintenance  Estimated Protein Needs: 58-73 grams protein/day (0.8 - 1 grams of pro/kg)  Justification: Maintenance  Estimated Fluid Needs: 1 mL/kcal  Justification: Maintenance or per provider    PHYSICAL FINDINGS  See malnutrition section below.  No abnormal nutrition-related physical findings observed.     MALNUTRITION  % Intake: < 75% for > 7 days (non-severe)  % Weight Loss: None noted  Subcutaneous Fat Loss: None observed  Muscle Loss: None observed  Fluid Accumulation/Edema: None noted  Malnutrition Diagnosis: Patient does not meet two of the above criteria necessary for diagnosing malnutrition    NUTRITION DIAGNOSIS  Predicted inadequate nutrient intake (calories/protein) related to hx of poor PO 2/2 EtOH, however, anticipate return of appetite/intakes with treatment.       INTERVENTIONS  Implementation  Nutrition Education: Provided education on role of RD in POC. Discussed nutrition history and PO since admission. Discussed menu ordering and encouraged pt to consume snacks available on the unit. Discussed oral nutrition supplements and pt states he has tried the Ensure and does not like it. Encouraged adequate PO of food and fluids. Also, provided nutritional tips for coping with nausea/vomiting.     Goals  Patient to consume % of nutritionally adequate meal trays TID, or the equivalent with supplements/snacks.     Monitoring/Evaluation  Progress toward goals will be monitored and evaluated per protocol.    Radha Ortiz, Registration Eligible  Unit Pager: [phone] Behavioral    I agree with the above findings and recommendations  Alycia MENDES

## 2017-05-15 NOTE — CONSULTS
"    Internal Medicine Consult Note    Orlando Chou MRN# [id number]   Age: 56 year old YOB: 1960     Date of Admission: 5/14/2017  Date of Consult:  5/14/2017     Referring provider: Sridhar Morh MD         Reason for Consult:   Hypertension         History of Present Illness:   Orlando Chou is a 56 year old man with history of alcohol abuse who was admitted to detox for alcohol abuse. History obtained with assistance of [facility] phone .    Patient has no history of high blood pressure. Reports that his blood pressure usually runs in 120-130s but for the past 2-3 days, he has been drinking a lot and not sleeping much. He attributes his high blood pressure to these factors. Had some chest pain earlier this morning but that has resolved. No other associated symptoms - no headache, blurry vision, or dyspnea. Does feel like he is shaking and knees are not steady.    Upon arrival to ED, received several liters of IVF, multivitamin, magnesium, thiamine, folate, and ativan. Has received 2 doses of diazepam on floor for MSSA 10-14.        Review of Systems:   Otherwise negative except as reported in HPI.         Past Medical History:   History reviewed. No pertinent past medical history.          Past Surgical History:    History reviewed. No pertinent surgical history.          Social History:     Social History   Substance Use Topics     Smoking status: Unknown If Ever Smoked     Smokeless tobacco: Not on file     Alcohol use Yes     Speaks Tibetan.          Family History:   Non-contributory.          Allergies:   No Known Allergies          Medications:   None         Physical Exam:   Vitals were reviewed  Blood pressure (!) 191/103, pulse 109, temperature 98.6  F (37  C), temperature source Oral, resp. rate 16, height 1.702 m (5' 7\"), weight 72.6 kg (160 lb), SpO2 100 %.    General: Initially sleeping soundly in bed, awakens and in no acute distress  HEENT: NCAT, sclera " anicteria, no conjunctival injection  Resp: breathing comfortably on room air   Abd: non-obese  Extremities: ambulates with assistance, gait unsteady  Neuro: alert and oriented, no lateralizing symptoms or focal neurologic deficits  Skin: no pallor, no rashes on exposed skin  Psych: normal affect, cooperative         Data:     140    104    7  /  -----------------------   101  3.2     26    0.76  \          \   13.9  /  8.2    -------   187    Total bili 0.5  ALT 48  AST 74    Lactate 3.5  Lipase 131  UA - 1 WBC, 1 RBC  Ethanol 0.13         Assessment and Recommendations:   Assessment: Orlando Chou is a 56 year old man with history of alcohol abuse who was admitted to detox for alcohol abuse.    Recommendations:  # Hypertension. No prior history of essential hypertension. Suspect 2/2 alcohol withdrawal +/- contributions from multiple liters of IVF.  - Continue MSSA protocol per primary team  - Hydralazine 10 mg PO q6h prn     # Alcohol intoxication/abuse. Per primary team.    Internal medicine will continue to follow peripherally.    Symone Anand MD  [facility] 2449

## 2017-05-15 NOTE — PLAN OF CARE
"Problem: Substance Withdrawal  Goal: Substance Withdrawal  Signs and symptoms of listed problems will be absent or manageable.     Blood pressure this morning 178/104 pulse is 105. Recheck after 101 minutes and earlier administration of scheduled medications foung /98 and pulse 91. [facility]  [facility]. States concentration is \"slightly better\" but is distractable and having poor concentration yesterday. Denies any suicidal ideation plans or intent. States \"today I'm much better than yesterday\" endorses \"stress because of my condition, being in [place], I need to find a job, a place to stay\" been here since October 2014.  Reports after detox wants to return to where he was prior to admission \"not planning to go to treatment for any of this\". States \"Compared to yesterday I'm much better today, comfortable\". MSSA score today is a 10. 10 mg po valium administered.     Would like to have his mobile phone to be able to watch movies or [employer] or [employer] movies to pass time. Dr. Car updated.           "

## 2017-05-15 NOTE — PLAN OF CARE
Problem: General Plan of Care (Inpatient Behavioral)  Goal: Individualization/Patient Specific Goal (IP Behavioral)  The patient and/or their representative will achieve their patient-specific goals related to the plan of care.    The patient-specific goals include:     1. Detox from Alcohol with MSSA Valium Protocol  2. Meet with CM to discuss treatment planning.  3. Physical exam and Lab evaluation   Medicine consult acquired for hypertension--pt / 122 [id number]/106.  Pt was given 4 liters of IVF in the ER--he is voiding frequently.  Hospitalist contacted and ordered hydralazine for BP.  Will continue to evaluate, medicate as ordered.

## 2017-05-15 NOTE — H&P
DATE OF ADMISSION:  2017      CHIEF COMPLAINT:  Alcohol dependence.      HISTORY OF PRESENT ILLNESS:  This is the first [facility] [place], admission for Orlando Chou who is a 56-year-old male.  The patient lives in [place] with his daughter and son-in-law.  He says he does not get along with them very well likely because of his drinking.  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      The patient enters [facility] for detoxification.  He was brought by ambulance to the emergency room.  The patient has been drinking heavily for the past 10 days.  He is vague regarding what led to his increased drinking episode, but it is likely with regard to relationship with his family.  In any event, he was drinking to the point where he was having difficulty stopping.  He was getting sick with nausea and shakes.  He came in for detoxification.  He has a previous history of excessive alcohol use 15-20 years ago when his wife  in an accident.  He drank heavily for a period of time and then stopped drinking for many years up until recently.  He has not been involved with a treatment program.  There is reference to him being from [facility], but the patient says he was living in [place].  He denies abuse of other drugs.  He now enters for further evaluation and therapy.  He wants to detox and says he will never drink again.      PAST MEDICAL HISTORY:  There is reference in the chart to previous treatment for tuberculosis.  The patient denies ever being treated at this time, but there might be some difficulty with the .  In any event, he had a chest x-ray showing old tuberculosis a year ago and repeat chest x-ray was suggested and we will get that now.      The patient does not have a primary care physician.  He never goes to the doctor.  Denies any medical illnesses, takes no medication.  Medical  illnesses:  None.  Denies heart disease, lung disease, liver disease, kidney disease, diabetes or epilepsy.  He is hypertensive here but has never been told he has hypertension.      PAST SURGICAL HISTORY:  None.      MEDICATIONS PRIOR TO ADMISSION:  None.      REVIEW OF SYSTEMS:   SKIN:  No rashes.   HEAD:  No headaches.   EYES:  No visual disturbance.   EARS:  No hearing loss.   MOUTH AND THROAT:  No difficulty swallowing.   PULMONARY:  No cough or shortness of breath.   CARDIOVASCULAR:  No chest pain.   GASTROINTESTINAL:  No nausea, vomiting, diarrhea or constipation.   GENITOURINARY:  Negative.   MUSCULOSKELETAL:  Negative.   NEUROLOGIC:  Tremulous with withdrawal.      PHYSICAL EXAMINATION:   VITAL SIGNS:  Temperature is 98.3, pulse is 105, respirations 16, blood pressure is 178/104.   GENERAL:  This is a well-developed, well-nourished 56-year-old male in moderate distress with alcohol withdrawal.  He is alert and cooperative and oriented.   SKIN:  Warm and moist.   HEAD:  Normal.   EYES:  Pupils equal, round, regular and reactive to light.  Conjunctivae normal.  Sclerae normal.   EARS:  Normal.   NOSE:  Normal.   MOUTH AND THROAT:  Lips normal.  Tongue normal.  Pharynx normal.   NECK:  Supple.  No masses, no thyroid enlargement.  Lymph nodes normal anterior and posterior cervical region.   PULMONARY:  Lungs clear to auscultation, good inspiration, good expiration, no wheezes, no rhonchi, no rales.   CARDIOVASCULAR:  Sinus tachycardia is present.   HEART:  Regular rate and rhythm, no murmurs.   ABDOMEN:  Soft, no distention, tenderness or rigidity.  Bowel sounds normal.  No masses, no organomegaly.   EXTREMITIES:  Full range of motion of all the extremities, no inflammation of the ankles, knees, hips, hands, shoulders or elbows bilaterally.   NEUROLOGIC:  Motor normal.  Sensory normal.  Coordination normal.   MENTAL STATUS:  Oriented to time, place, person and situation.  Attitude is cooperative.  Insight fair.   Judgment fair.      ASSESSMENT:   1.  Alcohol dependence with withdrawal.   2.  Hypertension.   3.  History of tuberculosis.      PLAN:  Inpatient detox, likely will not be a candidate for treatment.  Check chest x-ray. Clonidine for hypertension.         MARINA LAMB MD             D: 05/15/2017 08:43   T: 05/15/2017 09:47   MT: DONNELL      Name:     BOUCHRA NORWOOD   MRN:      [id number]        Account:      [id number]   :      1960           Admitted:     [id number]      Document: [id number]

## 2017-05-16 PROCEDURE — 25000132 ZZH RX MED GY IP 250 OP 250 PS 637: Performed by: PSYCHIATRY & NEUROLOGY

## 2017-05-16 PROCEDURE — 12800008 ZZH R&B CD ADULT

## 2017-05-16 PROCEDURE — 99231 SBSQ HOSP IP/OBS SF/LOW 25: CPT | Performed by: FAMILY MEDICINE

## 2017-05-16 PROCEDURE — 25000132 ZZH RX MED GY IP 250 OP 250 PS 637: Performed by: FAMILY MEDICINE

## 2017-05-16 RX ADMIN — CLONIDINE HYDROCHLORIDE 0.2 MG: 0.2 TABLET ORAL at 20:12

## 2017-05-16 RX ADMIN — NICOTINE POLACRILEX 8 MG: 4 GUM, CHEWING ORAL at 11:52

## 2017-05-16 RX ADMIN — NICOTINE POLACRILEX 8 MG: 4 GUM, CHEWING ORAL at 09:01

## 2017-05-16 RX ADMIN — CLONIDINE HYDROCHLORIDE 0.2 MG: 0.2 TABLET ORAL at 08:58

## 2017-05-16 RX ADMIN — DIAZEPAM 10 MG: 5 TABLET ORAL at 08:58

## 2017-05-16 RX ADMIN — Medication 100 MG: at 08:58

## 2017-05-16 RX ADMIN — NICOTINE POLACRILEX 8 MG: 4 GUM, CHEWING ORAL at 19:53

## 2017-05-16 RX ADMIN — HYDROXYZINE HYDROCHLORIDE 50 MG: 25 TABLET ORAL at 13:31

## 2017-05-16 RX ADMIN — MULTIPLE VITAMINS W/ MINERALS TAB 1 TABLET: TAB at 08:58

## 2017-05-16 RX ADMIN — FOLIC ACID 1 MG: 1 TABLET ORAL at 08:58

## 2017-05-16 RX ADMIN — NICOTINE POLACRILEX 8 MG: 4 GUM, CHEWING ORAL at 13:31

## 2017-05-16 RX ADMIN — NICOTINE POLACRILEX 8 MG: 4 GUM, CHEWING ORAL at 16:36

## 2017-05-16 RX ADMIN — NICOTINE 1 PATCH: 7 PATCH TRANSDERMAL at 08:58

## 2017-05-16 ASSESSMENT — ENCOUNTER SYMPTOMS
ACTIVITY IMPAIRMENT: NORMAL
ANOREXIA: 0
WEAKNESS: 0
SHORTNESS OF BREATH: 0
DIETARY ISSUES: ADEQUATE INTAKE
CHEST PRESSURE: 0
NO PATIENT REPORTED PAIN: 1

## 2017-05-16 ASSESSMENT — ACTIVITIES OF DAILY LIVING (ADL)
DRESS: INDEPENDENT
ORAL_HYGIENE: INDEPENDENT
GROOMING: INDEPENDENT

## 2017-05-16 NOTE — PROGRESS NOTES
Pt's family visited. His daughter, Keely Will and her .  RAMIREZ signed and placed in the chart.  Pt is wanting to go home.  Family is very concerned because pt has been drinking and gambling. Pt is living with his daughter because his siblings have kicked him out due to his behaviors. They are concerned that he will continue his same behaviors unless he can get into treatment.  Advised them to discuss with pt's , Lina.  This writer left a message for Lina with daughter's contact information.

## 2017-05-16 NOTE — PROGRESS NOTES
"Orlando Chou is a 56 year old male patient.  1. Alcohol dependence with withdrawal with complication (H)      History reviewed. No pertinent past medical history.  No current outpatient prescriptions on file.     No Known Allergies  Active Problems:    Chemical dependency (H)    Blood pressure 139/90, pulse 104, temperature 97.6  F (36.4  C), temperature source Oral, resp. rate 16, height 5' 7\" (1.702 m), weight 160 lb (72.6 kg), SpO2 100 %.    Subjective:  Symptoms:  Improved.  No shortness of breath, malaise, chest pain, weakness, chest pressure, anorexia or anxiety.    Diet:  Adequate intake.    Activity level: Normal.    Pain:  He reports no pain.      Objective:  General Appearance:  Comfortable, well-appearing and in no acute distress.    Vital signs: (most recent): Blood pressure 139/90, pulse 104, temperature 97.6  F (36.4  C), temperature source Oral, resp. rate 16, height 5' 7\" (1.702 m), weight 160 lb (72.6 kg), SpO2 100 %.  Vital signs are normal.    Lungs:  Normal respiratory rate and normal effort.  Breath sounds clear to auscultation.    Heart: Tachycardia.  Regular rhythm.    Neurological: Patient is alert and oriented to person, place and time.  Normal strength.    Skin:  Warm.      Assessment:    Condition: In serious condition.  Improving.   (Alcohol Dependence and Withdrawal   Hypertension).     Plan:   (Continue detox  Assess for what kind of help may be offered  Traditional treatment not likely to be helpful  Needs primary care follow up    20 minutes were spent with patient with more than 50% of time spent in counseling and coordination of care ).       Immanuel Car  5/16/2017    "

## 2017-05-16 NOTE — PLAN OF CARE
Problem: Substance Withdrawal  Goal: Substance Withdrawal  Signs and symptoms of listed problems will be absent or manageable.   Assessed pt with assist of phone .  Pt denies most w/d sx, scores 5 and 5. Difficult to assess pt's withdrawal sx, pt minimizing sx.  Pt c/o nicotine withdrawal and throat discomfort.  Cepacol and Nicotine patch order obtained.  Vital signs abnormal, see flowsheet.  Pt denies SI/SIB, depression, anxiety or hallucinations. Denies pain.  Pt family concerned pt needs CD treatment and treatment for gambling.  Pt is expecting to dc to home.  Message left for  to discuss with daughter.

## 2017-05-16 NOTE — PROGRESS NOTES
Pt' daughter Sunny came for a visit today. Sunny reports her father being very anxious about being able to watch videos on his phone overnight. Orlando became visibly upset about the ability to use his phone to the point of actually covering his face with his hands and crying. Pt reported that he did sleep for 6 hours last night and that despite the phone being on he was sleeping. He states that is how he sleeps at home. Pt was given 50 mg prn hydroxyzine for his higher level of anxiety. 70 mg of valium administered since admission, his MSSA scores today were an 9 and 7. Pt appeared calmer after the prn vistaril was administered.

## 2017-05-16 NOTE — PROGRESS NOTES
Spoke with Pt's daughter and informed her of intention to refer Pt to [facility] for CD treatment.  Attempted to have Pt sign RAMIREZ for referral but  was a no show at noon today.  Will attempt again tomorrow prior to discharge.  Pt states he feels that he will be able to stop drinking without outside help because it is in his heart and mind to do so.

## 2017-05-17 PROCEDURE — 25000132 ZZH RX MED GY IP 250 OP 250 PS 637: Performed by: FAMILY MEDICINE

## 2017-05-17 PROCEDURE — 99232 SBSQ HOSP IP/OBS MODERATE 35: CPT | Performed by: FAMILY MEDICINE

## 2017-05-17 PROCEDURE — 12800008 ZZH R&B CD ADULT

## 2017-05-17 PROCEDURE — 25000132 ZZH RX MED GY IP 250 OP 250 PS 637: Performed by: PSYCHIATRY & NEUROLOGY

## 2017-05-17 RX ADMIN — NICOTINE POLACRILEX 8 MG: 4 GUM, CHEWING ORAL at 14:48

## 2017-05-17 RX ADMIN — CLONIDINE HYDROCHLORIDE 0.2 MG: 0.2 TABLET ORAL at 08:18

## 2017-05-17 RX ADMIN — NICOTINE POLACRILEX 8 MG: 4 GUM, CHEWING ORAL at 20:16

## 2017-05-17 RX ADMIN — NICOTINE POLACRILEX 8 MG: 4 GUM, CHEWING ORAL at 22:11

## 2017-05-17 RX ADMIN — Medication 100 MG: at 08:18

## 2017-05-17 RX ADMIN — FOLIC ACID 1 MG: 1 TABLET ORAL at 08:18

## 2017-05-17 RX ADMIN — MULTIPLE VITAMINS W/ MINERALS TAB 1 TABLET: TAB at 08:18

## 2017-05-17 RX ADMIN — NICOTINE POLACRILEX 8 MG: 4 GUM, CHEWING ORAL at 12:19

## 2017-05-17 ASSESSMENT — ENCOUNTER SYMPTOMS
ANOREXIA: 0
NO PATIENT REPORTED PAIN: 1
DIETARY ISSUES: ADEQUATE INTAKE
CHEST PRESSURE: 0
WEAKNESS: 0
SHORTNESS OF BREATH: 0
ACTIVITY IMPAIRMENT: NORMAL

## 2017-05-17 NOTE — PROGRESS NOTES
Pt signed RAMIREZ for [facility] and referral was made for Pt to get an appointment for a CD assessment.  Will meet with Pt with phone  when appointment for assessment is provided.

## 2017-05-17 NOTE — PROGRESS NOTES
"Orlando Chou is a 56 year old male patient.  1. Alcohol dependence with withdrawal with complication (H)      History reviewed. No pertinent past medical history.  No current outpatient prescriptions on file.     No Known Allergies  Active Problems:    Chemical dependency (H)    Blood pressure 114/74, pulse 104, temperature 98.4  F (36.9  C), temperature source Oral, resp. rate 16, height 5' 7\" (1.702 m), weight 160 lb (72.6 kg), SpO2 100 %.    Subjective:  Symptoms:  Improved.  No shortness of breath, malaise, chest pain, weakness, chest pressure, anorexia or anxiety.    Diet:  Adequate intake.    Activity level: Normal.    Pain:  He reports no pain.      Objective:  General Appearance:  Comfortable, well-appearing and in no acute distress.    Vital signs: (most recent): Blood pressure 114/74, pulse 104, temperature 98.4  F (36.9  C), temperature source Oral, resp. rate 16, height 5' 7\" (1.702 m), weight 160 lb (72.6 kg), SpO2 100 %.  Vital signs are normal.    Lungs:  Normal respiratory rate and normal effort.  Breath sounds clear to auscultation.    Heart: Normal rate.  Regular rhythm.    Neurological: Patient is alert and oriented to person, place and time.  Normal strength.    Skin:  Warm and dry.      Assessment:    Condition: In serious condition.  Improving.   (Alcohol Dependence and Withdrawal   ).     Plan:   (Hold clonidine  Discharge tomorrow    30 minutes were spent with patient with more than 50% of time spent in counseling and coordination of care ).       Immanuel Car  5/17/2017    "

## 2017-05-17 NOTE — DISCHARGE SUMMARY
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.  He lives with his daughter and son-in-law.  He enters for detoxification.  He was brought by ambulance to the emergency room.  The patient admits to drinking heavily only over the past 10 days.  He has a previous history of excessive alcohol use 15-20 years ago when his wife  in an accident.  He stopped drinking for many years until recently.  He has not been involved with a treatment program.  He entered for detox and said he would never drink again.  He was not interested in treatment.      The patient was detoxed with Valium and did well.  He was hypertensive and was started on clonidine.      Laboratory work during the course of the hospital stay showed a normal chem profile and CBC.  His AST was 74, but his GGT was normal.  His hemoglobin and platelet count were normal.      The patient again declined treatment.  He was medically stable at the time of discharge.  He was advised to obtain a family physician and follow up to have his blood pressure checked.  At this point it is thought that his hypertension during the course of his detox was likely due to alcohol withdrawal and that he will be fairly normotensive if the clonidine is discontinued.  He may tend toward the hypertension range which should be addressed by his primary care physician.  Discharging him on clonidine with a potential for rebound hypertension was thought to be too risky.  The patient will return to work.  He is determined not to drink alcohol again.  Should he relapse, chemical dependency treatment will be indicated.      He met with case management and will be referred to [facility] for CD evaluation and treatment.      DISCHARGE DIAGNOSIS:  Alcohol dependence with withdrawal.      Thirty minutes were spent with the patient and record in completion of this discharge  summary with over 50% of time spent in counseling and coordination of care.         MARINA LAMB MD             D: 2017 08:41   T: 2017 10:32   MT: ELIZABETH      Name:     BOUCHRA NORWOOD   MRN:      [id number]        Account:        [id number]   :      1960           Admit Date:                                       Discharge Date:       Document: [id number]

## 2017-05-17 NOTE — PLAN OF CARE
Problem: Substance Withdrawal  Goal: Substance Withdrawal  Signs and symptoms of listed problems will be absent or manageable.   Outcome: Completed Date Met:  05/17/17  S: Patient scored less than 8 on the MSSA for greater than 24 hours. He has required no medication for alcohol withdrawal for > 24 hours,.  B: Patient admitted for alcohol withdrawal and detoxification.  A: Patient stable in the alcohol withdrawal process AEB MSSA scores < 8 for > 24 hours.  R: Patient removed from detox status per unit Protocol.

## 2017-05-17 NOTE — PROGRESS NOTES
"Pt remains in alcohol withdrawal but did not require valium this am. Spoke with pt via  regarding discharge planing.  He reports that he does not have a PCP or clinic and not sure where he would like to be seen.  He reports he work for [employer] but not sure if his insurance is active \"off work past 25 days.\" He reports that his family lives in the [facility] and that he will be discharging to his daughters home. He is agreeable to looking into [facility] and signing an RAMIREZ for \"[facility],\" and this was done and placed in the chart.  He reports mood is good, denies anxiety, ate 100% of breakfast, slept well and denies any needs or concerns.   "

## 2017-05-17 NOTE — PROGRESS NOTES
"Pt's daughter and son in law visited with Orlando this afternoon. The son in law was visibly irritated and very insistant that Kelsey be discharged to a \"rehab\" facility. With an  present, Orlando stated his son in law does not want him at their home. Orlando stated he feels he does not need treatment because this was only a \"15 day\" relapse.  He says that he can stay with other family members if unable to return to his daughters home. He is also requesting a note from Dr. Car regarding his hospital admission that he can give to his employer.Pt exhibiting minimal withdrawal symptoms this shift. . He states his mood has improved. Denies depression. Denies anxiety. He states he was stressed earlier in the day when he was informed by staff he could not use his phone to watch [email or web address] TV. This writer assured him he could use it tonight per MD order. Orlando also appears to have a greater understanding of English than previously thought.. He was able to communicate his needs to this writer without the use of an . This writer advised him to be \"open minded\" to  the discharge plan  recommended by his Doctor and the .  "

## 2017-05-18 PROCEDURE — 99231 SBSQ HOSP IP/OBS SF/LOW 25: CPT | Performed by: FAMILY MEDICINE

## 2017-05-18 PROCEDURE — 12800008 ZZH R&B CD ADULT

## 2017-05-18 PROCEDURE — H2035 A/D TX PROGRAM, PER HOUR: HCPCS | Mod: HQ

## 2017-05-18 PROCEDURE — 25000132 ZZH RX MED GY IP 250 OP 250 PS 637: Performed by: FAMILY MEDICINE

## 2017-05-18 PROCEDURE — 25000132 ZZH RX MED GY IP 250 OP 250 PS 637: Performed by: PSYCHIATRY & NEUROLOGY

## 2017-05-18 RX ADMIN — NICOTINE POLACRILEX 8 MG: 4 GUM, CHEWING ORAL at 22:06

## 2017-05-18 RX ADMIN — MULTIPLE VITAMINS W/ MINERALS TAB 1 TABLET: TAB at 09:02

## 2017-05-18 RX ADMIN — NICOTINE POLACRILEX 8 MG: 4 GUM, CHEWING ORAL at 13:13

## 2017-05-18 RX ADMIN — NICOTINE POLACRILEX 8 MG: 4 GUM, CHEWING ORAL at 19:22

## 2017-05-18 RX ADMIN — NICOTINE POLACRILEX 8 MG: 4 GUM, CHEWING ORAL at 09:02

## 2017-05-18 RX ADMIN — NICOTINE POLACRILEX 8 MG: 4 GUM, CHEWING ORAL at 17:24

## 2017-05-18 RX ADMIN — FOLIC ACID 1 MG: 1 TABLET ORAL at 09:02

## 2017-05-18 RX ADMIN — NICOTINE 1 PATCH: 7 PATCH TRANSDERMAL at 09:02

## 2017-05-18 ASSESSMENT — ACTIVITIES OF DAILY LIVING (ADL)
ORAL_HYGIENE: INDEPENDENT
GROOMING: INDEPENDENT
ORAL_HYGIENE: INDEPENDENT
DRESS: INDEPENDENT
GROOMING: INDEPENDENT
LAUNDRY: WITH SUPERVISION
LAUNDRY: WITH SUPERVISION
DRESS: SCRUBS (BEHAVIORAL HEALTH)

## 2017-05-18 ASSESSMENT — ENCOUNTER SYMPTOMS
ANOREXIA: 0
NO PATIENT REPORTED PAIN: 1
ACTIVITY IMPAIRMENT: NORMAL
DIETARY ISSUES: ADEQUATE INTAKE
SHORTNESS OF BREATH: 0
WEAKNESS: 0
CHEST PRESSURE: 0

## 2017-05-18 NOTE — DISCHARGE INSTRUCTIONS
Behavioral Discharge Planning and Instructions  THANK YOU FOR CHOOSING THE [address]  [phone]    Summary: You were admitted to Station [facility] on 5/14/2017 for detoxification from Alcohol.  A medical exam was performed that included lab work. You have met with a  and opted to discharge to [facility] for shelter.  Please take care and make your recovery a priority, Chel[place]!     You have an appointment at [facility] on Monday 5/22/17 @ 6 pm.  Be sure to attend this appointment.    [facility], [address]  [place] [address]  [phone]    Main Diagnoses:  Per Dr. Car;   Alcohol use disorder     Major Treatments, Procedures and Findings:  You were treated for Alcohol detoxification using Valium administered based on the [facility] protocol.   You have had labs drawn and those results have been reviewed with you. Please take a copy of your lab work with you to your next primary care physician appointment.    Symptoms to Report:  If you experience more anxiety, confusion, sleeplessness, deep sadness or thoughts of suicide, notify your treatment team or notify your primary care physician. IF ANY OF THE SYMPTOMS YOU ARE EXPERIENCING ARE A MEDICAL EMERGENCY CALL 911 IMMEDIATELY.     Lifestyle Adjustment: Adjust your lifestyle to get enough sleep, relaxation, exercise and good nutrition. Continue to develop healthy coping skills to decrease stress and promote a sober living environment. Do not use mood altering substances including alcohol, illegal drugs or addictive medications other than what is currently prescribed.     Follow-up Appointment:     Jun 16, 2017  9:00 AM ELYSSA   SHORT with Jazmin Finn DO   [facility]    [address] [place] [id number] [phone]        Resources:    http://www.aastpaul.org/?topic=8  http://a[place].org/meetings/  http://www.nam[place].org/index.php/meeting-list-pdf  http://www.harmreduction.org  [facility] [phone]  Support Group:  AA/NA and Sponsor/support  Crisis Intervention: 830.598.8311 or 340-715-7048 (TTY: 912.428.7953).  Call anytime for help.  National [place] on Mental Illness (www.[place].shan.org): 436.526.6263 or 537-882-9773.  Alcoholics Anonymous (www.alcoholics-anonymous.org): Check your phone book for your local chapter.  Suicide Awareness Voices of Education (SAVE) (www.save.org): 692-924-QXVC (2683)  National Suicide Prevention Line (www.mentalhealthmn.org): 258-672-GDHI (5452)  Mental Health Consumer/Survivor Network of [place] (www.mhcsn.net): 763.583.4504 or 072-590-5660  Mental Health Association of [place] (www.mentalhealth.org): 809.262.9953 or 020-911-5917   Substance Abuse and Mental Health Services (www.samhsa.gov)    [place] Recovery Connection ([facility])  [facility] connects people seeking recovery to resources that help foster and sustain long-term recovery.  Whether you are seeking resources for treatment, transportation, housing, job training, education, health care or other pathways to recovery, [facility] is a great place to start.  947.319.6619.  www.[place]arecovery.org    General Medication Instructions:   See your medication sheet(s) for instructions.   Take all medicines as directed.  Make no changes unless your doctor suggests them.   Go to all your doctor visits.  Be sure to have all your required lab tests. This way, your medicines can be refilled on time.  AA/NA and Sponsors are excellent resources for support and you can find one at any support group meeting.  Do not use any form of alcohol.    Please Note:  If you have any questions at anytime after you are discharged please call the [facility], [place] detox unit 3AW unit at [phone].  [facility] , [facility], Behavioral  [facility] [phone]  Please take this discharge folder with you to all your follow up appointments, it contains your lab results, diagnosis, medication list and discharge recommendations.      THANK YOU FOR CHOOSING THE [facility]

## 2017-05-18 NOTE — PROGRESS NOTES
"Orlando Chou is a 56 year old male patient.  1. Alcohol dependence with withdrawal with complication (H)      History reviewed. No pertinent past medical history.  No current outpatient prescriptions on file.     No Known Allergies  Active Problems:    Chemical dependency (H)    Blood pressure 120/81, pulse 102, temperature 97.1  F (36.2  C), temperature source Oral, resp. rate 16, height 5' 7\" (1.702 m), weight 160 lb (72.6 kg), SpO2 100 %.    Subjective:  Symptoms:  Improved.  No shortness of breath, malaise, chest pain, weakness, chest pressure, anorexia or anxiety.    Diet:  Adequate intake.    Activity level: Normal.    Pain:  He reports no pain.      Objective:  General Appearance:  Comfortable, well-appearing and in no acute distress.    Vital signs: (most recent): Blood pressure 120/81, pulse 102, temperature 97.1  F (36.2  C), temperature source Oral, resp. rate 16, height 5' 7\" (1.702 m), weight 160 lb (72.6 kg), SpO2 100 %.  Vital signs are normal.    Lungs:  Normal respiratory rate and normal effort.    Heart: Normal rate.  Regular rhythm.    Neurological: Patient is alert and oriented to person, place and time.  Normal strength.    Skin:  Warm and dry.      Assessment:    Condition: In serious condition.  Improving.   (Alcohol Dependence and Withdrawal  ).     Plan:   (Continue detox  Complete discharge plans    20 minutes were spent with patient with more than 50% of time spent in counseling and coordination of care ).       Immanuel Car  5/18/2017    "

## 2017-05-18 NOTE — PROGRESS NOTES
"Pt is asking questions during the daily assessment about places to stay reporting feeling unsure if any family is going to allow him to live with them after detox. States feeling anxious \"because I don't know where I'm going, I have no money, I'm anxious, depressed, sad\". Pt was brought to the case management along with  to discuss homeless shelter options after detox. States \"I feel if I can get a job and a place to stay I can get back on track\". Asked pt about his job at [employer] in [employer] keeping and he states hasn't been to work in about 7 days and is unsure if he still has a job. Encouraged pt to call Human Resources to discuss his hospitalization and that his Doctor here can give him a letter to bring along to his work to prove his hospitalization.   "

## 2017-05-18 NOTE — PROGRESS NOTES
Received call from Pt's daughter.  She states Pt can return to her home and that she does not want him to go to a shelter.  She stated she would call back with a time she could pick Pt up.  Informed Pt with  present.  Pt states he is not willing to go to his daughters as he suspects that they want to send him back to [facility].  He plans to discharge and go to the [facility] for assistance.  Contacted Pt's employer with Pt and  present.  Pt may return to work on Monday 5/22/17 for his regular shift.  Pt very thankful for assistance with this.  Stressed importance of Pt attending appointment with [facility] as they will be able to provide more assistance and referral for services for housing etc.  Pt acknowledged via .  Encouraged Pt to contact his daughter regarding getting his documents from her.  Pt acknowledged via .  Pt has high out of pocket costs for assessment and outpatient treatment at [facility].  They are uncertain as to whether Pt's insurance would cover the cost of an .  The  was going to request other resources if available and will call to provide information when she has it.  Pt will be informed via  this evening.

## 2017-05-18 NOTE — PROGRESS NOTES
Behavioral Health  Note    Behavioral Health  Spirituality Group Note    UNIT 3AW    Name: Orlando Chou YOB: 1960   MRN: [id number] Age: 56 year old      Patient attended -led group, which included discussion of spirituality, coping with illness and building resilience.    Patient attended group for 1.0 hrs.    The patient actively participated in group discussion and patient demonstrated an appreciation of topic's application for their personal circumstances.   In spite of an interpretor not being available to Mr. Chou, he was fully attentive/engaged and delightfully participatory in all the conversations and activities of the spirituality group process.    Topic/Focus of today's spirituality group: Spirituality and Core Identity  IMR tie-in: Receving Feedback/Ways to [place]      Savanna Shukla  Volunteer    Pager [phone]

## 2017-05-18 NOTE — PROGRESS NOTES
Met with Pt and .  Pt requested information on places to stay and assistance with contacting his employer.  Provided Pt with bus itinerary and address for [facility] in [place].  Provided him with a statement of introduction and needs to present at the shelter.  Pt signed RAMIREZ and Human Resources was contacted at his place of employment. Will conference call his manager at noon today with the  present and will provide Pt with letter of hospitalization.  Pt also requested assistance with obtaining his ID and other needed documents from his daughter.  Placed call to daughter and left VM.

## 2017-05-19 VITALS
TEMPERATURE: 97.2 F | WEIGHT: 160 LBS | HEART RATE: 107 BPM | OXYGEN SATURATION: 100 % | SYSTOLIC BLOOD PRESSURE: 134 MMHG | HEIGHT: 67 IN | RESPIRATION RATE: 16 BRPM | DIASTOLIC BLOOD PRESSURE: 85 MMHG | BODY MASS INDEX: 25.11 KG/M2

## 2017-05-19 PROCEDURE — 99238 HOSP IP/OBS DSCHRG MGMT 30/<: CPT | Performed by: FAMILY MEDICINE

## 2017-05-19 PROCEDURE — 25000132 ZZH RX MED GY IP 250 OP 250 PS 637: Performed by: FAMILY MEDICINE

## 2017-05-19 PROCEDURE — 25000132 ZZH RX MED GY IP 250 OP 250 PS 637: Performed by: PSYCHIATRY & NEUROLOGY

## 2017-05-19 RX ADMIN — FOLIC ACID 1 MG: 1 TABLET ORAL at 08:04

## 2017-05-19 RX ADMIN — NICOTINE POLACRILEX 8 MG: 4 GUM, CHEWING ORAL at 08:07

## 2017-05-19 RX ADMIN — MULTIPLE VITAMINS W/ MINERALS TAB 1 TABLET: TAB at 08:04

## 2017-05-19 RX ADMIN — NICOTINE 1 PATCH: 7 PATCH TRANSDERMAL at 08:05

## 2017-05-19 NOTE — PROGRESS NOTES
Pt discharge assisted via staff member Vilma ALFONSO. Pt daughter and sister here and are taking the patient to the shelter. Pt is now discharged.

## 2017-05-19 NOTE — PROGRESS NOTES
Pt given copy of his discharge instructions and lab report. All discharge plans and labs were discussed with patient. Pt reports no questions at this time regarding discharge plans or labs. He is prescribed no medications for discharge. Pt denies any suicidal ideation, plans or intent at this time. Patient discharge assisted entirely with  services. Pt only question is how to get from [facility] to [facility] and then to the [facility] and then back to [facility] via the bus. Pt given resources for the bus system.

## 2017-05-19 NOTE — PROGRESS NOTES
"SPIRITUAL HEALTH SERVICES Progress Note  [facility] ([facility]) 3AW       DATA:    While there was a bit of a language barrier, Orlando attended and participated in the Thursday afternoon spirituality group; his contributions were thoughtful and intentional. Throughout the balance of the day, each time I would encounter Orlando, he would regard me with a smile and a bow.         INTERVENTION:    Brief conversation; Spiritual Health Services ([facility]) provided the small booklet, \"Heart of a Buddha.\"  I asked if Orlando wanted to have a [facility] visit with a Tenriism; he indicated that he was feeling all-right.        OUTCOME:    As I shared the booklet (above) Orlando expressed gratitude, a question, and then, recognition: \"Buddha?\" I offered, \"Gautama Buddha ... Favio Gautama.\" Orlando smiled and said, \"This is my chris ....\"  I clarified that I only knew of English language Tenriism resources. Orlando replied that he will \"have family translate\" for him.        PLAN:    SHS remains available to Orlando.                                                                                                                        Savanna Shukla  Volunteer    Pager [phone]    "

## 2017-05-19 NOTE — PROGRESS NOTES
Received call from [facility] and they are willing to meet with Pt.  Pt is scheduled for an appointment on 5/22/17@6-8 pm.  Pt will be informed via .

## 2017-05-19 NOTE — PLAN OF CARE
Problem: General Plan of Care (Inpatient Behavioral)  Goal: Individualization/Patient Specific Goal (IP Behavioral)  The patient and/or their representative will achieve their patient-specific goals related to the plan of care.    The patient-specific goals include:     1. Detox from Alcohol with MSSA Valium Protocol  2. Meet with CM to discuss treatment planning.  3. Physical exam and Lab evaluation   Illnesses Management & Recovery  Patient identified  as trigger for relapse.  Patient identified  as a general wellness strategy.  Patient identified  as a warning sign that they are in danger of relapse.  Patient identified  as someone they cont on to get feedback from.  Patient identified  as a way to take action when in danger of relapse.  Patient identified  as a way to cope with stress or other symptoms.

## 2017-05-20 NOTE — DISCHARGE SUMMARY
DATE OF DISCHARGE:  2017       Bouchra Chou is a 56-year-old male.  He lives in [place] with his daughter and son-in-law.  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  He entered for detox from alcohol.  He has been drinking heavily for 10 days.  He was having difficulty stopping.  He had a previous history of excessive drinking 15-20 years ago when his wife  of an accident.  He then stopped drinking for many years until recently.  He has not been involved in a treatment program.      The patient spoke English very little and a Tibetan  was available each day.  Arrangements were made for him to have a treatment and evaluation in [facility].  He is discharged on 2017.  He was given information regarding shelters he can go to because his daughter and son-in-law would not take him back home.  He is discharged on no medications.      Laboratory work showed a normal chem profile and CBC.  He was given clonidine during the course of the hospital stay for hypertension but this was discontinued before discharge and his blood pressure remained normal.      DISCHARGE DIAGNOSIS:  Alcohol dependence with withdrawal.      Thirty minutes were spent with the patient and record in completion of this discharge summary with over 50% of time spent in counseling and coordination of care.         MARINA LAMB MD             D: 2017 08:15   T: 2017 08:48   MT: roger      Name:     BOUCHRA CHOU   MRN:      [id number]        Account:        [id number]   :      1960           Admit Date:     [id number]                                  Discharge Date: 2017      Document: [id number]

## 2017-05-24 ENCOUNTER — TELEPHONE (OUTPATIENT)
Dept: OTHER | Facility: CLINIC | Age: 57
End: 2017-05-24

## 2017-05-24 ENCOUNTER — CARE COORDINATION (OUTPATIENT)
Dept: CARE COORDINATION | Facility: CLINIC | Age: 57
End: 2017-05-24

## 2017-05-24 DIAGNOSIS — F10.239 ALCOHOL DEPENDENCE WITH WITHDRAWAL WITH COMPLICATION (H): Primary | ICD-10-CM

## 2017-05-24 NOTE — TELEPHONE ENCOUNTER
Clinical Product Navigator RN reviewed chart; patient on payer product coverage.  Review results: Met referral criteria for Care Coordinator; referral to be sent.    Pt had a recent IP stay and upon d/c, pt was going to shelter.  Please attempt to contact pt to assist with resources or any needs he may have.    Radha Toro RN/Clinical Product Navigator

## 2017-05-24 NOTE — PROGRESS NOTES
"Clinic Care Coordination Contact--Social Work Initial Call/Assessment--post hospital   Care Team Conversations    Psychosocial: Chart review done. Referral from Radha Vitale. Patient recently at [facility] for alcohol dependence. He was given shelter resources as ED note indicates Patient's family will no longer allow him to live them.  Arrangements were made for Patient to attend outpatient CD treatment at [facility] in [place].  This program offers bilingual staff.  MAGO called Patient's only listed # ([phone]) with [place]  [facility]. Patient speaks Tibetan.  Female answered phone, stated that Patient was not there but she was his daughter.  Patient's daughter speaks fluent English,  remained on the phone.  Daughter reports that Patient is currently living with his brother in [place] but will continue to receive mail at their home in [place].  This is listed address.  She reports Patient is \"mad at them\" and chose not to live with them.  She reports Patient attended outpatient treatment once and when found it was not mandatory chose not to return.  Daughter reports Patient has no desire to attend treatment stating he does not find it helpful.  Daughter reports \"this is an ongoing problem, her father drinks and then stops and does not drink for several years again\".  SW informed of Patient's scheduled appointment with Dr. Finn.  Daughter reports she was unaware of appointment but will inform Patient.  SW provided her contact information for daughter to provide to Patient.  MAGO will update Dr. Finn as she is scheduled to see Patient 6/16/2017.  There will be no further Social Service intervention at this time.    Queta Salas, ANTONETTE, MSW   [phone]  5/24/2017 4:20 PM    "

## 2017-06-22 ENCOUNTER — CARE COORDINATION (OUTPATIENT)
Dept: CARE COORDINATION | Facility: CLINIC | Age: 57
End: 2017-06-22

## 2017-06-22 NOTE — LETTER
[facility] CARE COORDINATION  [address] [place] [address]      June 22, 2017      Orlando Chou  [address] [facility] [address]    Dear Mr. Chou,  I am the Clinic Care Coordinator that works with your primary care provider's clinic. I wanted to introduce myself and provide you with my contact information for you to be able to call me with any questions or concerns. Below is a description of what Clinic Care Coordination is and how I can further assist you.     We would like to assist you with finding a primary physician and scheduling an appointment if you are interested in doing so.      The Clinic Care Coordinator role is a Registered Nurse and/or  who understands the health care system. The goal of Clinic Care Coordination is to help you manage your health and improve access to the [facility] system in the most efficient manner.  The Registered Nurse can assist you in meeting your health care goals by providing education, coordinating services, and strengthening the communication among your providers. The  can assist you with financial, behavioral, psychosocial, and chemical dependency and counseling/psychiatric resources.    Please feel free to keep this letter and contact information to contact me at [phone] with any further questions or concerns that may arise. We at [facility] are focused on providing you with the highest-quality healthcare experience possible and that all starts with you.       Sincerely,     ANTONETTE Bennett, MSW    Clinical Care Coordination  [facility]  [phone]

## 2017-06-22 NOTE — PROGRESS NOTES
"Clinic Care Coordination Contact--Social Work Initial Call/Assessment  Care Team Conversations    5/24/2017: Per MAGO's initial attempt to call this Patient: Referral from Radha Vitale. Patient recently at [facility] for alcohol dependence. He was given shelter resources as ED note indicates Patient's family will no longer allow him to live them.  Arrangements were made for Patient to attend outpatient CD treatment at [facility] in [place].  This program offers bilingual staff.  MAGO called Patient's only listed # ([phone]) with [place]  Veena. Patient speaks Tibetan.  Female answered phone, stated that Patient was not there but she was his daughter.  Patient's daughter speaks fluent English,  remained on the phone.  Daughter reports that Patient is currently living with his brother in [place] but will continue to receive mail at their home in [place].  This is listed address and mailing address.  She reports Patient is \"mad at them\" and chose not to live with them.  She reports Patient attended outpatient treatment once and when found it was not mandatory chose not to return.  Daughter reports Patient has no desire to attend treatment stating he does not find it helpful.  Daughter reports \"this is an ongoing problem, her father drinks and then stops and does not drink for several years again\".  SW informed of Patient's scheduled appointment with Dr. Finn.  Daughter reports she was unaware of appointment but will inform Patient.  SW provided her contact information for daughter to provide to Patient.    6/22/2017:   Psychosocial: Patient did not show for scheduled hospital follow up 6/16/2017.  Care team informed MAGO that Patient reported he would not reschedule--please see appointment notes.  It is believed the  had called Patient to reschedule and updated the clinic care team.  Per chart review, Patient does not have any scheduled appointments.  " The only listed # for Patient is for daughter, she reported that Patient is now living with his brother in [place] per last call.  SW discussed with supervisor, will initially send letter to Patient's daughter in English, as she spoke English in our conversation.  Will request daughter give to Patient.  Daughter reported that this is Patient's mailing address.  Will reassess calling Patient and/or daughter in 1 week.    ANTONETTE Bennett, MSW   [phone]  6/22/2017 12:10 PM

## 2017-06-30 NOTE — PROGRESS NOTES
"Clinic Care Coordination Contact--Social Work   Care Team Conversations    Psychosocial/Update:     5/24/2017: Per MAGO's initial attempt to call this Patient: Referral from Radha Vitale. Patient recently at [facility] for alcohol dependence. He was given shelter resources as ED note indicates Patient's family will no longer allow him to live them.  Arrangements were made for Patient to attend outpatient CD treatment at [facility] in [place].  This program offers bilingual staff.  SW called Patient's only listed # ([phone]) with [place]  Veena. Patient speaks Tibetan.  Female answered phone, stated that Patient was not there but she was his daughter.  Patient's daughter speaks fluent English,  remained on the phone.  Daughter reports that Patient is currently living with his brother in [place] but will continue to receive mail at their home in [place].  This is listed address and mailing address.  She reports Patient is \"mad at them\" and chose not to live with them.  She reports Patient attended outpatient treatment once and when found it was not mandatory chose not to return.  Daughter reports Patient has no desire to attend treatment stating he does not find it helpful.  Daughter reports \"this is an ongoing problem, her father drinks and then stops and does not drink for several years again\".  SW informed of Patient's scheduled appointment with Dr. Finn.  Daughter reports she was unaware of appointment but will inform Patient.  SW provided her contact information for daughter to provide to Patient.     6/22/2017:   Psychosocial: Patient did not show for scheduled hospital follow up 6/16/2017.  Care team informed MAGO that Patient reported he would not reschedule--please see appointment notes.  It is believed the  had called Patient to reschedule and updated the clinic care team.  Per chart review, Patient does not have any scheduled appointments. "  The only listed # for Patient is for daughter, she reported that Patient is now living with his brother in [place] per last call.  SW discussed with supervisor, will initially send letter to Patient's daughter in English, as she spoke English in our conversation.  Will request daughter give to Patient.  Daughter reported that this is Patient's mailing address.  Will reassess calling Patient and/or daughter in 1 week.    6/30/2017: Chart review done.  Patient does not have scheduled clinic visit.  There has been no return response from Unable to Contact letter sent to Patient's daughter.  Daughter seemed annoyed on last call when SW was attempting to reach Patient.  SW will not make further attempts to reach Patient at this time.      Queta Salas, ANTONETTE, MSW   [phone]  6/30/2017 1:19 PM

## 2017-07-14 ENCOUNTER — TELEPHONE (OUTPATIENT)
Dept: FAMILY MEDICINE | Facility: CLINIC | Age: 57
End: 2017-07-14

## 2017-07-14 NOTE — TELEPHONE ENCOUNTER
: 1960  PHONE #'s: [phone] (home)     PRESENTING PROBLEM: called and talked Pama and gatherer information.  Patient had had a bad cough for 3 days.  OTC cough syrup is not working at all.  He is unable to sleep due to the cough.    PATIENT DENIES: shortness of breath, chest pain feeling of suffocation, frothy pink sputum,difficulty breathing, sudden onset.      RECOMMENDED DISPOSITION:  See in 24 hours -advised to bring to  today. She will call back and cancel appointment if she doesn't need it  Will comply with recommendation: YES  If further questions/concerns or if Sx do not improve, worsen or new Sx develop, call your PCP or [facility] as soon as possible.    NOTES:  Disposition was determined by the first positive assessment question, therefore all previous assessment questions were negative.  Informed to check provider manual or call insurance company to assure coverage.    Guideline used: Cough  Telephone Triage Protocols for Nurses, Fifth Edition, Shantel Sprague, GIANFRANCO  2017

## 2017-07-14 NOTE — TELEPHONE ENCOUNTER
Reason for call:  Patient reporting a symptom    Symptom or request: cough    Duration (how long have symptoms been present): 3 days    Have you been treated for this before? No    Additional comments: patient has been taking Vicks cough syrup but it is not helping, sisiter is wondering what else can patient take. patient has apt for monday    Phone Number patient can be reached at:  Cell number on file:    Telephone Information:   Mobile [phone]     Best Time:  anytime    Can we leave a detailed message on this number:  YES    Call taken on 7/14/2017 at 1:26 PM by Margy Roy

## 2017-07-17 ENCOUNTER — RADIANT APPOINTMENT (OUTPATIENT)
Dept: GENERAL RADIOLOGY | Facility: CLINIC | Age: 57
End: 2017-07-17
Attending: PHYSICIAN ASSISTANT
Payer: COMMERCIAL

## 2017-07-17 ENCOUNTER — OFFICE VISIT (OUTPATIENT)
Dept: FAMILY MEDICINE | Facility: CLINIC | Age: 57
End: 2017-07-17
Payer: COMMERCIAL

## 2017-07-17 VITALS
WEIGHT: 145 LBS | BODY MASS INDEX: 22.76 KG/M2 | HEART RATE: 104 BPM | OXYGEN SATURATION: 97 % | HEIGHT: 67 IN | DIASTOLIC BLOOD PRESSURE: 89 MMHG | SYSTOLIC BLOOD PRESSURE: 130 MMHG | TEMPERATURE: 98.2 F

## 2017-07-17 DIAGNOSIS — R05.9 COUGH: Primary | ICD-10-CM

## 2017-07-17 DIAGNOSIS — R05.9 COUGH: ICD-10-CM

## 2017-07-17 DIAGNOSIS — F19.20 CHEMICAL DEPENDENCY (H): ICD-10-CM

## 2017-07-17 DIAGNOSIS — R03.0 ELEVATED BLOOD PRESSURE READING WITHOUT DIAGNOSIS OF HYPERTENSION: ICD-10-CM

## 2017-07-17 PROCEDURE — 99213 OFFICE O/P EST LOW 20 MIN: CPT | Performed by: PHYSICIAN ASSISTANT

## 2017-07-17 PROCEDURE — 71020 XR CHEST 2 VW: CPT

## 2017-07-17 RX ORDER — BENZONATATE 200 MG/1
200 CAPSULE ORAL 3 TIMES DAILY PRN
Qty: 21 CAPSULE | Refills: 0 | Status: SHIPPED | OUTPATIENT
Start: 2017-07-17 | End: 2018-08-31

## 2017-07-17 NOTE — NURSING NOTE
".  Chief Complaint   Patient presents with     Cough     Health Maintenance     Tetanus, ADP, Hep C, Lipid, and Colon Cancer Screen        Initial /87 (BP Location: Right arm, Patient Position: Chair, Cuff Size: Adult Regular)  Pulse 104  Temp 98.2  F (36.8  C) (Oral)  Ht 5' 6.58\" (1.691 m)  Wt 145 lb (65.8 kg)  SpO2 97%  BMI 23 kg/m2 Estimated body mass index is 23 kg/(m^2) as calculated from the following:    Height as of this encounter: 5' 6.58\" (1.691 m).    Weight as of this encounter: 145 lb (65.8 kg).  Medication Reconciliation: complete   GILBERTO Wilson MA      "

## 2017-07-17 NOTE — PROGRESS NOTES
"  SUBJECTIVE:                                                    Orlando Chou is a 56 year old male who presents to clinic today for the following health issues:      Acute Illness   Acute illness concerns: Cough  Onset: 4-5 days    Fever: no     Chills/Sweats: no     Headache (location?): no     Sinus Pressure:no    Conjunctivitis:  no    Ear Pain: no    Rhinorrhea: no    Congestion: no    Sore Throat: no     Cough: YES-productive of clear sputum    Wheeze: no    Decreased Appetite: YES    Nausea: no    Vomiting: no    Diarrhea:  no    Dysuria/Freq.: no    Fatigue/Achiness: YES    Sick/Strep Exposure: no     Therapies Tried and outcome: OTC cough medicine, pt said it has helped a little.     Phelgm is a slight yellow color.     Patient abstaining form alcohol at this time.         Problem list and histories reviewed & adjusted, as indicated.  Additional history: as documented    Patient Active Problem List   Diagnosis     Chemical dependency (H)     Abnormal chest x-ray     Nicotine dependence     History reviewed. No pertinent surgical history.    Social History   Substance Use Topics     Smoking status: Unknown If Ever Smoked     Smokeless tobacco: Not on file     Alcohol use Yes     History reviewed. No pertinent family history.        Reviewed and updated as needed this visit by clinical staff  Tobacco  Allergies  Meds  Med Hx  Surg Hx  Fam Hx  Soc Hx      Reviewed and updated as needed this visit by Provider         ROS:  Constitutional, HEENT, cardiovascular, pulmonary, gi and gu systems are negative, except as otherwise noted.    OBJECTIVE:     /87 (BP Location: Right arm, Patient Position: Chair, Cuff Size: Adult Regular)  Pulse 104  Temp 98.2  F (36.8  C) (Oral)  Ht 5' 6.58\" (1.691 m)  Wt 145 lb (65.8 kg)  SpO2 97%  BMI 23 kg/m2  Body mass index is 23 kg/(m^2).  GENERAL: healthy, alert and no distress  HENT: ear canals and TM's normal, but with fluid seen bilaterally, nose and mouth " without ulcers or lesions  NECK: no adenopathy,   RESP:Rhonchi heard throughout the right lung. Left lung clear.   CV: regular rate and rhythm, normal S1 S2, no S3 or S4, no murmur, click or rub, no peripheral edema and peripheral pulses strong    Diagnostic Test Results:  none     ASSESSMENT/PLAN:       ICD-10-CM    1. Cough R05 XR Chest 2 Views     benzonatate (TESSALON) 200 MG capsule   2. Chemical dependency (H) F19.20    3. Elevated blood pressure reading without diagnosis of hypertension R03.0    CXR appears to be unchanged from previous- no acute findings  Discussed viral illness and expectations.   May try tessalon as needed.         FUTURE APPOINTMENTS:       - Follow-up for annual visit or as needed    Ellyn Suresh PA-C  [facility]

## 2017-07-17 NOTE — NURSING NOTE
"Chief Complaint   Patient presents with     Cough     Health Maintenance     Tetanus, ADP, Hep C, Lipid, and Colon Cancer Screen        Initial /89 (BP Location: Right arm, Patient Position: Chair, Cuff Size: Adult Regular)  Pulse 104  Temp 98.2  F (36.8  C) (Oral)  Ht 5' 6.58\" (1.691 m)  Wt 145 lb (65.8 kg)  SpO2 97%  BMI 23 kg/m2 Estimated body mass index is 23 kg/(m^2) as calculated from the following:    Height as of this encounter: 5' 6.58\" (1.691 m).    Weight as of this encounter: 145 lb (65.8 kg).  Medication Reconciliation: kwadwo Wilson MA      "

## 2017-07-17 NOTE — PROGRESS NOTES
Results discussed directly with patient while patient was present. Any further details documented in the note.   Ellyn Suresh PA-C

## 2017-07-17 NOTE — MR AVS SNAPSHOT
"              After Visit Summary   2017    Orlando Chou    MRN: [id number]           Patient Information     Date Of Birth          1960        Visit Information        Provider Department      2017 10:00 AM Chasity Ohara; Ellyn Suresh PA-C [facility]        Today's Diagnoses     Cough    -  1    Chemical dependency (H)        Elevated blood pressure reading without diagnosis of hypertension           Follow-ups after your visit        Who to contact     If you have questions or need follow up information about today's clinic visit or your schedule please contact [facility] directly at [phone].  Normal or non-critical lab and imaging results will be communicated to you by MyChart, letter or phone within 4 business days after the clinic has received the results. If you do not hear from us within 7 days, please contact the clinic through [employer] or phone. If you have a critical or abnormal lab result, we will notify you by phone as soon as possible.  Submit refill requests through [employer]ix or call your pharmacy and they will forward the refill request to us. Please allow 3 business days for your refill to be completed.          Additional Information About Your Visit        MyChart Information     [employer]ix lets you send messages to your doctor, view your test results, renew your prescriptions, schedule appointments and more. To sign up, go to [email or web address] . Click on \"Log in\" on the left side of the screen, which will take you to the Welcome page. Then click on \"Sign up Now\" on the right side of the page.     You will be asked to enter the access code listed below, as well as some personal information. Please follow the directions to create your username and password.     Your access code is: [email or web address]  Expires: 8/15/2017  7:35 AM     Your access code will  in 90 days. If you need help or a new code, please call your " "[facility] or [phone].        Care EveryWhere ID     This is your Care EveryWhere ID. This could be used by other organizations to access your [facility] medical records  [id number]        Your Vitals Were     Pulse Temperature Height Pulse Oximetry BMI (Body Mass Index)       104 98.2  F (36.8  C) (Oral) 5' 6.58\" (1.691 m) 97% 23 kg/m2        Blood Pressure from Last 3 Encounters:   07/17/17 142/87   05/19/17 134/85   01/19/16 (!) 144/93    Weight from Last 3 Encounters:   07/17/17 145 lb (65.8 kg)   05/14/17 160 lb (72.6 kg)   01/19/16 162 lb (73.5 kg)                 Today's Medication Changes          These changes are accurate as of: 7/17/17 10:51 AM.  If you have any questions, ask your nurse or doctor.               Start taking these medicines.        Dose/Directions    benzonatate 200 MG capsule   Commonly known as:  TESSALON   Used for:  Cough   Started by:  Ellyn Suresh PA-C        Dose:  200 mg   Take 1 capsule (200 mg) by mouth 3 times daily as needed for cough   Quantity:  21 capsule   Refills:  0            Where to get your medicines      These medications were sent to [facility] [address]  [address] [facility] [address]     Phone:  [phone]     benzonatate 200 MG capsule                Primary Care Provider    Physician No Ref-Primary       No address on file        Equal Access to Services     AMAURY SINGH AH: Hadii ksenia strong hadasho Soomaali, waaxda luqadaha, qaybta kaalmada adeegyada, waxuriel ormero stewart. So [facility] [phone].    ATENCIÓN: Si habla español, tiene a liz disposición servicios gratuitos de asistencia lingüística. Llame al [phone].    We comply with applicable federal civil rights laws and [place] laws. We do not discriminate on the basis of race, color, national origin, age, disability sex, sexual orientation or gender identity.            Thank you!     Thank you for " choosing [facility]  for your care. Our goal is always to provide you with excellent care. Hearing back from our patients is one way we can continue to improve our services. Please take a few minutes to complete the written survey that you may receive in the mail after your visit with us. Thank you!             Your Updated Medication List - Protect others around you: Learn how to safely use, store and throw away your medicines at www.disposemymeds.org.          This list is accurate as of: 7/17/17 10:51 AM.  Always use your most recent med list.                   Brand Name Dispense Instructions for use Diagnosis    benzonatate 200 MG capsule    TESSALON    21 capsule    Take 1 capsule (200 mg) by mouth 3 times daily as needed for cough    Cough

## 2017-09-25 ENCOUNTER — TELEPHONE (OUTPATIENT)
Dept: FAMILY MEDICINE | Facility: CLINIC | Age: 57
End: 2017-09-25

## 2017-09-25 DIAGNOSIS — Z12.11 SPECIAL SCREENING FOR MALIGNANT NEOPLASMS, COLON: Primary | ICD-10-CM

## 2017-09-25 NOTE — LETTER
September 25, 2017    Orlando Chou  [address] [facility] [address]    Dear Orlando    We care about your health and have reviewed your health plan. We have reviewed your medical conditions, medication list, and lab results and are making recommendations based on this review, to better manage your health.    You are in particular need of attention regarding:  - Scheduling a Colon Cancer Screening (Colonoscopy only) [phone]      Here is a list of Health Maintenance topics that are due now or due soon:  Health Maintenance Due   Topic Date Due     TETANUS IMMUNIZATION (SYSTEM ASSIGNED)  10/26/1977     HEPATITIS C SCREENING  10/26/1977     LIPID SCREEN Q5 YR MALE (SYSTEM ASSIGNED)  10/26/1994     COLON CANCER SCREEN (SYSTEM ASSIGNED)  10/26/2009     ADVANCE DIRECTIVE PLANNING Q5 YRS  10/26/2014     INFLUENZA VACCINE (SYSTEM ASSIGNED)  09/01/2017     We will be calling you in the next couple of weeks to help you schedule any appointments that are needed.  Please call us at [phone] (or use [employer]) to address the above recommendations.     Thank you for trusting [facility] and we appreciate the opportunity to serve you.  We look forward to supporting your healthcare needs in the future.    Healthy Regards,    KRISTI Agiular CMA

## 2017-09-25 NOTE — TELEPHONE ENCOUNTER
Panel Management Review      Patient has the following on his problem list: None      Composite cancer screening  Chart review shows that this patient is due/due soon for the following Colonoscopy  Summary:    Patient is due/failing the following:   COLONOSCOPY    Action needed:   Patient needs referral/order: colon order pended    Type of outreach:    Sent letter. 09/25/2017    Questions for provider review:    Order sent to Ellyn to sign                                                                                                                                    Mansi Madden [facility]       Chart routed to Care Team .

## 2017-10-04 NOTE — TELEPHONE ENCOUNTER
I called and left message for patient to call back regarding scheduling a colonoscopy.  Manis Madden CMA

## 2017-10-17 NOTE — TELEPHONE ENCOUNTER
Patient's brother came into the clinic stating that the patient has been trying to schedule a colonoscopy appointment for the last 2 weeks.   He states no one is calling him back.  Please call the patient at the home number listed on his chart to help him get this appointment scheduled.    Rubi DE LA FUENTE  Patient Representative  [place]

## 2017-10-20 NOTE — TELEPHONE ENCOUNTER
I called and left message for patient with scheduling number. I will send to Ellyn Suresh to sign order.  Mansi Madden CMA

## 2018-03-15 ENCOUNTER — TELEPHONE (OUTPATIENT)
Dept: FAMILY MEDICINE | Facility: CLINIC | Age: 58
End: 2018-03-15

## 2018-03-15 NOTE — TELEPHONE ENCOUNTER
Panel Management Review      Patient has the following on his problem list: None      Composite cancer screening  Chart review shows that this patient is due/due soon for the following Colonoscopy  Summary:    Patient is due/failing the following:   Lipid and COLONOSCOPY    Action needed:   Patient needs office visit for Colonoscopy and Lipid.    Type of outreach:    Sent letter.    Questions for provider review:    None                                                                                                                                    GILBERTO Wilson MA       Chart routed to Care Team .

## 2018-03-15 NOTE — LETTER
May 9, 2018    Orlando Chou  [address]  [facility] [address]      Dear Orlando Chou,     We have tried to contact you about your health, but have been unable to reach you.  Please call us as soon as possible so we can provide you with the best care possible.  We will continue to check in with you throughout the year to complete these items of care, if you are not able to complete these items at this time.  If you would like to complete the missing items for your care, please contact us at [phone].    We recommend the following:  -schedule a LAB ONLY APPOINTMENT to recheck your: Lipids (fasting cholesterol - nothing to eat except water and/or meds for 8+ hours) test within the next 1-4 weeks.  -schedule a COLONOSCOPY to look for colon cancer (due every 10 years or 5 years in higher risk situations.)   Colon cancer is now the second leading cause of death in the [place] for both men and women and there are over 130,000 new cases and 50,000 deaths per year from colon cancer.  Colonoscopies can prevent 90-95% of these deaths.  Problem lesions can be removed before they ever become cancer.  This test is not only looking for cancer, but also getting rid of precancerious lesions.  If you do not wish to do a colonoscopy or cannot afford to do one, at this time, there is another option. It is called a FIT test.        Sincerely,     Your Care Team at [facility]

## 2018-03-15 NOTE — LETTER
March 15, 2018    Orlando Chou  [address] [facility] [address]    Dear Orlando    We care about your health and have reviewed your health plan. We have reviewed your medical conditions, medication list, and lab results and are making recommendations based on this review, to better manage your health.    You are in particular need of attention regarding:  - Scheduling a Colon Cancer Screening (Colonoscopy only) [phone]      Here is a list of Health Maintenance topics that are due now or due soon:  Health Maintenance Due   Topic Date Due     TETANUS IMMUNIZATION (SYSTEM ASSIGNED)  10/26/1977     HEPATITIS C SCREENING  10/26/1977     LIPID SCREEN Q5 YR MALE (SYSTEM ASSIGNED)  10/26/1994     COLON CANCER SCREEN (SYSTEM ASSIGNED)  10/26/2009     ADVANCE DIRECTIVE PLANNING Q5 YRS  10/26/2014     We will be calling you in the next couple of weeks to help you schedule any appointments that are needed.  Please call us at [phone] (or use [employer]) to address the above recommendations.     Thank you for trusting [facility] and we appreciate the opportunity to serve you.  We look forward to supporting your healthcare needs in the future.    Healthy Regards,    Your Health Care Team     Team # 3

## 2018-08-31 ENCOUNTER — OFFICE VISIT (OUTPATIENT)
Dept: FAMILY MEDICINE | Facility: CLINIC | Age: 58
End: 2018-08-31
Payer: COMMERCIAL

## 2018-08-31 VITALS
SYSTOLIC BLOOD PRESSURE: 134 MMHG | WEIGHT: 153.75 LBS | DIASTOLIC BLOOD PRESSURE: 72 MMHG | HEIGHT: 67 IN | BODY MASS INDEX: 24.13 KG/M2 | HEART RATE: 103 BPM | TEMPERATURE: 99 F | OXYGEN SATURATION: 97 %

## 2018-08-31 DIAGNOSIS — R05.9 COUGH: Primary | ICD-10-CM

## 2018-08-31 PROCEDURE — 99213 OFFICE O/P EST LOW 20 MIN: CPT | Performed by: NURSE PRACTITIONER

## 2018-08-31 ASSESSMENT — PAIN SCALES - GENERAL: PAINLEVEL: MILD PAIN (2)

## 2018-08-31 NOTE — LETTER
[address] [address]  Phone: [phone]  Fax: [phone]    August 31, 2018        Orlando Chou  [address] [place]. [facility]"" [id number]          To whom it may concern:    RE: Orlando Chou    Patient was seen and treated today at our clinic. Please excuse his recent absences from work.     Please contact me for questions or concerns.      Sincerely,        SANTOSH Scanlon CNP

## 2018-08-31 NOTE — PROGRESS NOTES
"  SUBJECTIVE:   Orlando Chou is a 57 year old male who presents to clinic today for the following health issues:    Concern - Cold and Cough  Onset:  4-5 days ago    Description:   Cold, cough     Intensity: moderate    Progression of Symptoms:  same    Accompanying Signs & Symptoms:  Cold, cough    Previous history of similar problem:   No    Precipitating factors:   Worsened by: movement    Alleviating factors:  Improved by: Cough syrup    Therapies Tried and outcome: Cough syrup helped with cough.     No fevers  Productive cough, unsure of sputum color  Denies SOB, wheezing  He is using over the counter cough syrup which helps  Cough improving past few days  Sick from work for 3 days, went back to work yesterday and they request a letter  He returned to work today with any difficulty        Problem list and histories reviewed & adjusted, as indicated.  Additional history: none    Patient Active Problem List   Diagnosis     Chemical dependency (H)     Abnormal chest x-ray     Nicotine dependence     History reviewed. No pertinent surgical history.    Social History   Substance Use Topics     Smoking status: Current Every Day Smoker     Types: Cigarettes     Smokeless tobacco: Current User     Alcohol use Yes     History reviewed. No pertinent family history.        Reviewed and updated as needed this visit by clinical staff       Reviewed and updated as needed this visit by Provider         ROS:  Constitutional, HEENT, cardiovascular, pulmonary, gi and gu systems are negative, except as otherwise noted.    OBJECTIVE:     /72  Pulse 103  Temp 99  F (37.2  C) (Oral)  Ht 5' 6.58\" (1.691 m)  Wt 153 lb 12 oz (69.7 kg)  SpO2 97%  BMI 24.39 kg/m2  Body mass index is 24.39 kg/(m^2).  GENERAL: healthy, alert and no distress  HENT: ear canals and TM's normal, nose and mouth without ulcers or lesions  NECK: no adenopathy, no asymmetry, masses, or scars and thyroid normal to palpation  RESP: lungs clear to " auscultation - no rales, rhonchi or wheezes  CV: regular rate and rhythm, normal S1 S2, no S3 or S4, no murmur, click or rub, no peripheral edema and peripheral pulses strong    Diagnostic Test Results:  none     ASSESSMENT/PLAN:       ICD-10-CM    1. Cough R05    2. Chemical dependency (H) F19.20        Physical exam unremarkable. I do not think a chest xray is indicated. Heart rate slightly elevated though no hypotension, and symptoms are improving. He does not appear to be distressed. I do not think antibiotics or imaging are indicated at this time. Advised likely viral. Continue with cough syrup as needed. He will follow up with me in 1 week for annual physical exam and to establish care. Follow up sooner if cough worsening. Reviewed concerning S/S and when to go to ED    SANTOSH Scanlon [facility]

## 2018-08-31 NOTE — MR AVS SNAPSHOT
"              After Visit Summary   8/31/2018    Orlando Chou    MRN: [id number]           Patient Information     Date Of Birth          1960        Visit Information        Provider Department      8/31/2018 2:40 PM Nelsy Thakkar APRN CNP; MULTILINGUAL [facility]        Today's Diagnoses     Cough    -  1    Chemical dependency (H)           Follow-ups after your visit        Your next 10 appointments already scheduled     Sep 07, 2018  8:00 AM CDT   PHYSICAL with SANTOSH Jacobsen CNP   [facility]    [address] [facility] [address] [phone]              Who to contact     If you have questions or need follow up information about today's clinic visit or your schedule please contact [facility] directly at [phone].  Normal or non-critical lab and imaging results will be communicated to you by MyChart, letter or phone within 4 business days after the clinic has received the results. If you do not hear from us within 7 days, please contact the clinic through MyChart or phone. If you have a critical or abnormal lab result, we will notify you by phone as soon as possible.  Submit refill requests through Screen or call your pharmacy and they will forward the refill request to us. Please allow 3 business days for your refill to be completed.          Additional Information About Your Visit        Care EveryWhere ID     This is your Care EveryWhere ID. This could be used by other organizations to access your [place] medical records  [id number]        Your Vitals Were     Pulse Temperature Height Pulse Oximetry BMI (Body Mass Index)       103 99  F (37.2  C) (Oral) 5' 6.58\" (1.691 m) 97% 24.39 kg/m2        Blood Pressure from Last 3 Encounters:   08/31/18 134/72   07/17/17 130/89   01/19/16 (!) 144/93    Weight from Last 3 Encounters: "   08/31/18 153 lb 12 oz (69.7 kg)   07/17/17 145 lb (65.8 kg)   01/19/16 162 lb (73.5 kg)              Today, you had the following     No orders found for display       Primary Care Provider Fax #    Physician No Ref-Primary [phone]       No address on file        Equal Access to Services     DURGA SAMANTHA : Hadii aad ku hadasho Soomaali, waaxda luqadaha, qaybta kaalmada adeezequielyada, damaris kincaidjudikia stallworth . So [facility] [phone].    ATENCIÓN: Si habla español, tiene a liz disposición servicios gratuitos de asistencia lingüística. Llame al [phone].    We comply with applicable federal civil rights laws and [place] laws. We do not discriminate on the basis of race, color, national origin, age, disability, sex, sexual orientation, or gender identity.            Thank you!     Thank you for choosing [facility]  for your care. Our goal is always to provide you with excellent care. Hearing back from our patients is one way we can continue to improve our services. Please take a few minutes to complete the written survey that you may receive in the mail after your visit with us. Thank you!             Your Updated Medication List - Protect others around you: Learn how to safely use, store and throw away your medicines at www.disposemymeds.org.      Notice  As of 8/31/2018  3:24 PM    You have not been prescribed any medications.

## 2018-08-31 NOTE — PROGRESS NOTES
"  SUBJECTIVE:   Orlando Chou is a 57 year old male who presents to clinic today for the following health issues:  {Provider please address medication reconciliation discrepancies--rooming staff please delete if no med/rec issues}    Acute Illness   Acute illness concerns: ***  Onset: ***    Fever: { :036142::\"no\"}    Chills/Sweats: { :963571::\"no\"}    Headache (location?): { :716896::\"no\"}    Sinus Pressure:{.:012714::\"no\"}    Conjunctivitis:  {.:679298::\"no\"}    Ear Pain: {.:576504::\"no\"}    Rhinorrhea: { :873394::\"no\"}    Congestion: { :580317::\"no\"}    Sore Throat: { :261408::\"no\"}     Cough: {.:363584::\"no\"}    Wheeze: { :739315::\"no\"}    Decreased Appetite: { :702166::\"no\"}    Nausea: { :898433::\"no\"}    Vomiting: { :311598::\"no\"}    Diarrhea:  { :217318::\"no\"}    Dysuria/Freq.: { :169239::\"no\"}    Fatigue/Achiness: { :198464::\"no\"}    Sick/Strep Exposure: { :193308::\"no\"}     Therapies Tried and outcome: ***      {additional problems for provider to add:136709}    Problem list and histories reviewed & adjusted, as indicated.  Additional history: {NONE - AS DOCUMENTED:846158::\"as documented\"}    {HIST REVIEW/ LINKS 2:382392}    Reviewed and updated as needed this visit by clinical staff       Reviewed and updated as needed this visit by Provider         {PROVIDER CHARTING PREFERENCE:581764}    "

## 2018-09-17 ENCOUNTER — OFFICE VISIT (OUTPATIENT)
Dept: FAMILY MEDICINE | Facility: CLINIC | Age: 58
End: 2018-09-17
Payer: COMMERCIAL

## 2018-09-17 ENCOUNTER — RADIANT APPOINTMENT (OUTPATIENT)
Dept: GENERAL RADIOLOGY | Facility: CLINIC | Age: 58
End: 2018-09-17
Attending: NURSE PRACTITIONER
Payer: COMMERCIAL

## 2018-09-17 VITALS
OXYGEN SATURATION: 98 % | SYSTOLIC BLOOD PRESSURE: 158 MMHG | BODY MASS INDEX: 25.38 KG/M2 | WEIGHT: 160 LBS | TEMPERATURE: 98.2 F | HEART RATE: 90 BPM | DIASTOLIC BLOOD PRESSURE: 74 MMHG

## 2018-09-17 DIAGNOSIS — I10 HYPERTENSION, GOAL BELOW 140/90: ICD-10-CM

## 2018-09-17 DIAGNOSIS — M79.661 PAIN OF RIGHT LOWER LEG: ICD-10-CM

## 2018-09-17 DIAGNOSIS — J22 LOWER RESPIRATORY INFECTION: ICD-10-CM

## 2018-09-17 DIAGNOSIS — R05.9 COUGH: Primary | ICD-10-CM

## 2018-09-17 DIAGNOSIS — F10.29 ALCOHOL DEPENDENCE WITH UNSPECIFIED ALCOHOL-INDUCED DISORDER (H): ICD-10-CM

## 2018-09-17 DIAGNOSIS — R05.9 COUGH: ICD-10-CM

## 2018-09-17 LAB
ALBUMIN SERPL-MCNC: 3.4 G/DL (ref 3.4–5)
ALP SERPL-CCNC: 105 U/L (ref 40–150)
ALT SERPL W P-5'-P-CCNC: 41 U/L (ref 0–70)
ANION GAP SERPL CALCULATED.3IONS-SCNC: 10 MMOL/L (ref 3–14)
AST SERPL W P-5'-P-CCNC: 36 U/L (ref 0–45)
BILIRUB SERPL-MCNC: 0.3 MG/DL (ref 0.2–1.3)
BUN SERPL-MCNC: 8 MG/DL (ref 7–30)
CALCIUM SERPL-MCNC: 8.4 MG/DL (ref 8.5–10.1)
CHLORIDE SERPL-SCNC: 106 MMOL/L (ref 94–109)
CO2 SERPL-SCNC: 26 MMOL/L (ref 20–32)
CREAT SERPL-MCNC: 0.82 MG/DL (ref 0.66–1.25)
D DIMER PPP FEU-MCNC: 0.8 UG/ML FEU (ref 0–0.5)
ERYTHROCYTE [DISTWIDTH] IN BLOOD BY AUTOMATED COUNT: 15.3 % (ref 10–15)
ETHANOL SERPL-MCNC: 0.04 G/DL
GFR SERPL CREATININE-BSD FRML MDRD: >90 ML/MIN/1.7M2
GLUCOSE SERPL-MCNC: 104 MG/DL (ref 70–99)
HCT VFR BLD AUTO: 40.1 % (ref 40–53)
HGB BLD-MCNC: 13.7 G/DL (ref 13.3–17.7)
MCH RBC QN AUTO: 32.2 PG (ref 26.5–33)
MCHC RBC AUTO-ENTMCNC: 34.2 G/DL (ref 31.5–36.5)
MCV RBC AUTO: 94 FL (ref 78–100)
PLATELET # BLD AUTO: 277 10E9/L (ref 150–450)
POTASSIUM SERPL-SCNC: 3.2 MMOL/L (ref 3.4–5.3)
PROT SERPL-MCNC: 7.5 G/DL (ref 6.8–8.8)
RBC # BLD AUTO: 4.26 10E12/L (ref 4.4–5.9)
SODIUM SERPL-SCNC: 142 MMOL/L (ref 133–144)
WBC # BLD AUTO: 6.7 10E9/L (ref 4–11)

## 2018-09-17 PROCEDURE — 85379 FIBRIN DEGRADATION QUANT: CPT | Performed by: NURSE PRACTITIONER

## 2018-09-17 PROCEDURE — 80320 DRUG SCREEN QUANTALCOHOLS: CPT | Mod: 59 | Performed by: NURSE PRACTITIONER

## 2018-09-17 PROCEDURE — 99214 OFFICE O/P EST MOD 30 MIN: CPT | Performed by: NURSE PRACTITIONER

## 2018-09-17 PROCEDURE — 36415 COLL VENOUS BLD VENIPUNCTURE: CPT | Performed by: NURSE PRACTITIONER

## 2018-09-17 PROCEDURE — 71046 X-RAY EXAM CHEST 2 VIEWS: CPT | Mod: FY

## 2018-09-17 PROCEDURE — 85027 COMPLETE CBC AUTOMATED: CPT | Performed by: NURSE PRACTITIONER

## 2018-09-17 PROCEDURE — 80053 COMPREHEN METABOLIC PANEL: CPT | Performed by: NURSE PRACTITIONER

## 2018-09-17 RX ORDER — LOSARTAN POTASSIUM 25 MG/1
25 TABLET ORAL DAILY
Qty: 30 TABLET | Refills: 1 | Status: SHIPPED | OUTPATIENT
Start: 2018-09-17 | End: 2018-09-25

## 2018-09-17 RX ORDER — AZITHROMYCIN 250 MG/1
TABLET, FILM COATED ORAL
Qty: 6 TABLET | Refills: 0 | Status: SHIPPED | OUTPATIENT
Start: 2018-09-17 | End: 2018-09-25

## 2018-09-17 ASSESSMENT — PAIN SCALES - GENERAL: PAINLEVEL: EXTREME PAIN (8)

## 2018-09-17 NOTE — LETTER
[address] [address]  Phone: [phone]  Fax: [phone]    September 17, 2018        Orlando Chou  [address] [facility] [address]          To whom it may concern:    RE: Orlando Chou    Patient was seen and treated today at our clinic. Please excuse his absences from work this week. He can return to work Sep 26, 2018.    Please contact me for questions or concerns.      Sincerely,        SANTOSH Scanlon CNP

## 2018-09-17 NOTE — MR AVS SNAPSHOT
After Visit Summary   9/17/2018    Orlando Chou    MRN: [id number]           Patient Information     Date Of Birth          1960        Visit Information        Provider Department      9/17/2018 10:20 AM Nlesy Thakkar APRN CNP; VIDEO,  [facility]        Today's Diagnoses     Cough    -  1    Lower respiratory infection        Alcohol dependence with unspecified alcohol-induced disorder (H)        Pain of right lower leg        Hypertension, goal below 140/90           Follow-ups after your visit        Follow-up notes from your care team     Return in about 1 week (around 9/24/2018) for Follow up.      Future tests that were ordered for you today     Open Future Orders        Priority Expected Expires Ordered    XR Chest 2 Views Routine 9/17/2018 9/17/2019 9/17/2018            Who to contact     If you have questions or need follow up information about today's clinic visit or your schedule please contact [facility] directly at [phone].  Normal or non-critical lab and imaging results will be communicated to you by MyChart, letter or phone within 4 business days after the clinic has received the results. If you do not hear from us within 7 days, please contact the clinic through [employer] or phone. If you have a critical or abnormal lab result, we will notify you by phone as soon as possible.  Submit refill requests through [employer] or call your pharmacy and they will forward the refill request to us. Please allow 3 business days for your refill to be completed.          Additional Information About Your Visit        Care EveryWhere ID     This is your Care EveryWhere ID. This could be used by other organizations to access your [place] medical records  [id number]        Your Vitals Were     Pulse Temperature Pulse Oximetry BMI (Body Mass Index)          90 98.2  F (36.8  C) (Oral) 98% 25.38 kg/m2         Blood Pressure from  Last 3 Encounters:   09/17/18 158/74   08/31/18 134/72   07/17/17 130/89    Weight from Last 3 Encounters:   09/17/18 160 lb (72.6 kg)   08/31/18 153 lb 12 oz (69.7 kg)   07/17/17 145 lb (65.8 kg)              We Performed the Following     Alcohol ethyl     CBC with platelets     Comprehensive metabolic panel (BMP + Alb, Alk Phos, ALT, AST, Total. Bili, TP)     D dimer, quantitative          Today's Medication Changes          These changes are accurate as of 9/17/18 11:06 AM.  If you have any questions, ask your nurse or doctor.               Start taking these medicines.        Dose/Directions    azithromycin 250 MG tablet   Commonly known as:  ZITHROMAX   Used for:  Cough, Lower respiratory infection   Started by:  Nelsy Thakkar APRN CNP        Two tablets first day, then one tablet daily for four days.   Quantity:  6 tablet   Refills:  0       losartan 25 MG tablet   Commonly known as:  COZAAR   Used for:  Hypertension, goal below 140/90   Started by:  Nelsy Thakkar APRN CNP        Dose:  25 mg   Take 1 tablet (25 mg) by mouth daily   Quantity:  30 tablet   Refills:  1            Where to get your medicines      These medications were sent to [facility] [address]  [address] [facility] [address]     Phone:  [phone]     azithromycin 250 MG tablet    losartan 25 MG tablet                Primary Care Provider Office Phone # Fax #    SANTOSH Jacobsen [facility] [phone]       [address] [facility] [address]        Equal Access to Services     [facility] Center[facility] : Hadii aad ku hadasho Soomaali, waaxda luqadaha, qaybta kaalmada adeegyada, damaris stewart. So [facility] [phone].    ATENCIÓN: Si habla español, tiene a liz disposición servicios gratuitos de asistencia lingüística. Llame al [phone].    We comply with applicable federal civil rights laws and  [place] laws. We do not discriminate on the basis of race, color, national origin, age, disability, sex, sexual orientation, or gender identity.            Thank you!     Thank you for choosing [facility]  for your care. Our goal is always to provide you with excellent care. Hearing back from our patients is one way we can continue to improve our services. Please take a few minutes to complete the written survey that you may receive in the mail after your visit with us. Thank you!             Your Updated Medication List - Protect others around you: Learn how to safely use, store and throw away your medicines at www.disposemymeds.org.          This list is accurate as of 9/17/18 11:06 AM.  Always use your most recent med list.                   Brand Name Dispense Instructions for use Diagnosis    azithromycin 250 MG tablet    ZITHROMAX    6 tablet    Two tablets first day, then one tablet daily for four days.    Cough, Lower respiratory infection       losartan 25 MG tablet    COZAAR    30 tablet    Take 1 tablet (25 mg) by mouth daily    Hypertension, goal below 140/90

## 2018-09-17 NOTE — PROGRESS NOTES
SUBJECTIVE:   Orlando Chou is a 57 year old male who presents to clinic today for the following health issues:   is not available in house or by phone.sister will help to translate.    Acute Illness   Acute illness concerns: uri  Onset: 1 weeks    Fever: no     Chills/Sweats: YES    Headache (location?): YES    Sinus Pressure:no    Conjunctivitis:  no    Ear Pain: no    Rhinorrhea: no     Congestion: no     Sore Throat: no     Cough: YES-productive of yellow sputum and notice blood     Wheeze: no     Decreased Appetite: YES    Nausea: YES    Vomiting: YES    Diarrhea:  no     Dysuria/Freq.: YES frequence    Fatigue/Achiness: YES- very tired and sleeping    Sick/Strep Exposure: no      Therapies Tried and outcome: juanita     I saw him 8/31/18 for 4 day history of cough  At that time symptoms were improving  Advised likely viral  Cough has not resolved  Had 2 episodes of coughing up blood (unable to decipher whether blood tinged sputum or mercedez blood)  No clots  Sputum otherwise yellow  Denies SOB, wheezing  Body feels weak  Denies fevers, night sweats, weight loss    Complains of right calf pain x2 weeks  Reports he was hospitalized in [place] 10 years ago when he had similar pain  Can not say why he was hospitalized   Whole body felt weak  No history of blood clot    He smells like alcohol  Did not drink today  Last drank yesterday, whiskey  3 cups of whiskey (gestures to show how big a cup is)  Sounds like he has been drinking daily past 2 few weeks  Denies tremor, hallucinations, sweating    He requests letter to excuse from work for 10 days    Problem list and histories reviewed & adjusted, as indicated.  Additional history: none    Patient Active Problem List   Diagnosis     Chemical dependency (H)     Abnormal chest x-ray     Nicotine dependence     History reviewed. No pertinent surgical history.    Social History   Substance Use Topics     Smoking status: Current Every Day Smoker     Types:  Cigarettes     Smokeless tobacco: Current User     Alcohol use Yes     History reviewed. No pertinent family history.      BP Readings from Last 3 Encounters:   09/17/18 158/74   08/31/18 134/72   07/17/17 130/89    Wt Readings from Last 3 Encounters:   09/17/18 160 lb (72.6 kg)   08/31/18 153 lb 12 oz (69.7 kg)   07/17/17 145 lb (65.8 kg)                    Reviewed and updated as needed this visit by clinical staff  Tobacco  Allergies  Meds  Med Hx  Surg Hx  Fam Hx  Soc Hx      Reviewed and updated as needed this visit by Provider         ROS:  Constitutional, HEENT, cardiovascular, pulmonary, gi and gu systems are negative, except as otherwise noted.    OBJECTIVE:     /74  Pulse 90  Temp 98.2  F (36.8  C) (Oral)  Wt 160 lb (72.6 kg)  SpO2 98%  BMI 25.38 kg/m2  Body mass index is 25.38 kg/(m^2).  GENERAL: alert and no distress, smells like alcohol, appears disheveled  RESP: lungs clear to auscultation - no rales, rhonchi or wheezes  CV: regular rate and rhythm, normal S1 S2, no S3 or S4, no murmur, click or rub, no peripheral edema and peripheral pulses strong, negative Chalino's  MS: no gross musculoskeletal defects noted, no edema  SKIN: no suspicious lesions or rashes  NEURO: Normal strength and tone, mentation intact and speech normal    Diagnostic Test Results:  Results for orders placed or performed in visit on 09/17/18 (from the past 24 hour(s))   CBC with platelets   Result Value Ref Range    WBC 6.7 4.0 - 11.0 10e9/L    RBC Count 4.26 (L) 4.4 - 5.9 10e12/L    Hemoglobin 13.7 13.3 - 17.7 g/dL    Hematocrit 40.1 40.0 - 53.0 %    MCV 94 78 - 100 fl    MCH 32.2 26.5 - 33.0 pg    MCHC 34.2 31.5 - 36.5 g/dL    RDW 15.3 (H) 10.0 - 15.0 %    Platelet Count 277 150 - 450 10e9/L       ASSESSMENT/PLAN:       ICD-10-CM    1. Cough R05 azithromycin (ZITHROMAX) 250 MG tablet     XR Chest 2 Views     D dimer, quantitative   2. Lower respiratory infection J22 azithromycin (ZITHROMAX) 250 MG tablet   3.  Alcohol dependence with unspecified alcohol-induced disorder (H) F10.29 Alcohol ethyl   4. Pain of right lower leg M79.661 CBC with platelets     Comprehensive metabolic panel (BMP + Alb, Alk Phos, ALT, AST, Total. Bili, TP)   5. Hypertension, goal below 140/90 I10 losartan (COZAAR) 25 MG tablet       Unfortunately, history taking was difficult without an . His sister helped with interpretation, though her English is limited.   He does not appear to be in any distress. His right calf pain and report of coughing up blood I am concerned about PE though physical exam is unremarkable. No tachycardia, hypotension, reduced oxygen saturations. Although sounds like he coughed up blood tinged sputum and not true mercedez blood. Therefore, I am checking d-dimer and advised that if he develops shortness of breath, swelling right calf or coughing up bright red blood would need to go to ED  Will treat for bacterial lower respiratory infection. X-ray on way out of clinic for radiology review  Reviewed S/S and when to go to ED  If symptoms not improving over next 2-3 days follow up in clinic  Discussed use of alcohol. Unsure exactly how much he is drinking. Reviewed risks of chronic drinking and impaired healing.   BP elevated. Start ARB and f/u 2 weeks.     More than 25 minutes spent with patient, more than 50% of which was spent on counseling and coordination of care.    SANTOSH Scanlon [facility]

## 2018-09-18 ENCOUNTER — TRANSFERRED RECORDS (OUTPATIENT)
Dept: HEALTH INFORMATION MANAGEMENT | Facility: CLINIC | Age: 58
End: 2018-09-18

## 2018-09-18 ENCOUNTER — TELEPHONE (OUTPATIENT)
Dept: FAMILY MEDICINE | Facility: CLINIC | Age: 58
End: 2018-09-18

## 2018-09-18 NOTE — TELEPHONE ENCOUNTER
Please call patient with   Blood work and x-ray are concerning for blood clot or collapsed lung.  I would like for him to go to the emergency this morning. He will need a CT scan right away.

## 2018-09-18 NOTE — TELEPHONE ENCOUNTER
"Unable to find a Tibetan interpretor through  services.  \"no one available at this time\". No options given.    Called Patient's Sister Paul, who was with patient in clinic yesterday.  She states Patient lives with her and is current with her. She states her English is limited so she passed the phone to her Son Mikey Self to Translate.  Mikey placed the call on speaker so he could translate the call for Orlando and Paul per Niyah's permission.  Info below was given to patient.  He didn't want to go to the ER because \"it takes too long\".  They also have concerns that an interpretor would not be available.  Explained the importance and seriousness of the results and that ER would be the best option for further testing and treatment.  Patient agreed to be seen at [facility].  Mikey, Nephew, states he would be able to attend ER visit for short time to help translate.    No further questions.  Ning Ramirez RN      "

## 2018-09-25 ENCOUNTER — OFFICE VISIT (OUTPATIENT)
Dept: FAMILY MEDICINE | Facility: CLINIC | Age: 58
End: 2018-09-25
Payer: COMMERCIAL

## 2018-09-25 VITALS
BODY MASS INDEX: 25.7 KG/M2 | WEIGHT: 162 LBS | TEMPERATURE: 97.2 F | DIASTOLIC BLOOD PRESSURE: 78 MMHG | OXYGEN SATURATION: 98 % | SYSTOLIC BLOOD PRESSURE: 128 MMHG | HEART RATE: 87 BPM

## 2018-09-25 DIAGNOSIS — Z09 HOSPITAL DISCHARGE FOLLOW-UP: Primary | ICD-10-CM

## 2018-09-25 DIAGNOSIS — J43.9 PULMONARY EMPHYSEMA, UNSPECIFIED EMPHYSEMA TYPE (H): ICD-10-CM

## 2018-09-25 DIAGNOSIS — Z72.0 TOBACCO ABUSE: ICD-10-CM

## 2018-09-25 DIAGNOSIS — I10 HYPERTENSION, GOAL BELOW 140/90: ICD-10-CM

## 2018-09-25 DIAGNOSIS — I26.99 OTHER ACUTE PULMONARY EMBOLISM WITHOUT ACUTE COR PULMONALE (H): ICD-10-CM

## 2018-09-25 PROCEDURE — 99214 OFFICE O/P EST MOD 30 MIN: CPT | Performed by: NURSE PRACTITIONER

## 2018-09-25 RX ORDER — LOSARTAN POTASSIUM 25 MG/1
25 TABLET ORAL DAILY
Qty: 90 TABLET | Refills: 3 | Status: SHIPPED | OUTPATIENT
Start: 2018-09-25 | End: 2019-04-29

## 2018-09-25 RX ORDER — NICOTINE 21 MG/24HR
1 PATCH, TRANSDERMAL 24 HOURS TRANSDERMAL EVERY 24 HOURS
Qty: 14 PATCH | Refills: 2 | Status: SHIPPED | OUTPATIENT
Start: 2018-09-25 | End: 2019-04-29

## 2018-09-25 NOTE — MR AVS SNAPSHOT
After Visit Summary   9/25/2018    Orlando Chou    MRN: [id number]           Patient Information     Date Of Birth          1960        Visit Information        Provider Department      9/25/2018 9:20 AM Nelsy Thakkar APRN CNP; PHONE,  [facility]        Today's Diagnoses     Hospital discharge follow-up    -  1    Other acute pulmonary embolism without acute cor pulmonale (H)        Pulmonary emphysema, unspecified emphysema type (H)        Hypertension, goal below 140/90        Tobacco abuse          Care Instructions    Taking 15 mg tablet Xarelto twice daily for 3 weeks  Then take 20 mg tablet Xarelto once daily for 6 months  I sent a prescription for 20 mg tablets to our clinic's pharmacy    I want you to see a hematologist (blood specialist). Please call [phone] to schedule. This is not urgent, ok to be done within the next few months    Wear 14 mg nicotine patch for 6-8 weeks, then go down to 7 mg patch for an additional 2 weeks. I sent these patches to our pharmacy    You may return to work Monday, October 1    If you have difficulty returning to work, please follow up with me in clinic next week    Otherwise, please follow up with me in February  If any concerns/problems arise in the meantime, you can see another provider in clinic while I'm on maternity leave                      Follow-ups after your visit        Additional Services     ONC/HEME ADULT REFERRAL       Your provider has referred you to: [facility]: [place] [facility] [phone]  [email or web address]    New diagnosis, unprovoked PE, 9/18/18. Started on Xarelto.     Please be aware that coverage of these services is subject to the terms and limitations of your health insurance plan.  Call member services at your health plan with any benefit or coverage questions.      Please  bring the following with you to your appointment:    (1) Any X-Rays, CTs or MRIs which have been performed.  Contact the facility where they were done to arrange for  prior to your scheduled appointment.   (2) List of current medications  (3) This referral request   (4) Any documents/labs given to you for this referral                  Who to contact     If you have questions or need follow up information about today's clinic visit or your schedule please contact [facility] directly at [phone].  Normal or non-critical lab and imaging results will be communicated to you by MyChart, letter or phone within 4 business days after the clinic has received the results. If you do not hear from us within 7 days, please contact the clinic through MyChart or phone. If you have a critical or abnormal lab result, we will notify you by phone as soon as possible.  Submit refill requests through [employer] or call your pharmacy and they will forward the refill request to us. Please allow 3 business days for your refill to be completed.          Additional Information About Your Visit        Care EveryWhere ID     This is your Care EveryWhere ID. This could be used by other organizations to access your [place] medical records  [id number]        Your Vitals Were     Pulse Temperature Pulse Oximetry BMI (Body Mass Index)          87 97.2  F (36.2  C) (Oral) 98% 25.7 kg/m2         Blood Pressure from Last 3 Encounters:   09/25/18 128/78   09/17/18 158/74   08/31/18 134/72    Weight from Last 3 Encounters:   09/25/18 162 lb (73.5 kg)   09/17/18 160 lb (72.6 kg)   08/31/18 153 lb 12 oz (69.7 kg)              We Performed the Following     ONC/HEME ADULT REFERRAL          Today's Medication Changes          These changes are accurate as of 9/25/18 10:00 AM.  If you have any questions, ask your nurse or doctor.               Start taking these medicines.        Dose/Directions    * nicotine 14 MG/24HR 24  hr patch   Commonly known as:  NICODERM CQ   Used for:  Tobacco abuse   Started by:  Nelsy Thakkar APRN CNP        Dose:  1 patch   Place 1 patch onto the skin every 24 hours   Quantity:  14 patch   Refills:  2       * nicotine 7 MG/24HR 24 hr patch   Commonly known as:  NICODERM CQ   Used for:  Tobacco abuse   Started by:  Nelsy Thakkar APRN CNP        Dose:  1 patch   Place 1 patch onto the skin every 24 hours   Quantity:  14 patch   Refills:  1       * Notice:  This list has 2 medication(s) that are the same as other medications prescribed for you. Read the directions carefully, and ask your doctor or other care provider to review them with you.      These medicines have changed or have updated prescriptions.        Dose/Directions    * rivaroxaban ANTICOAGULANT 15 MG Tabs tablet   Commonly known as:  XARELTO   This may have changed:  Another medication with the same name was added. Make sure you understand how and when to take each.   Changed by:  Nelsy Thakkar APRN CNP        Dose:  15 mg   Take 15 mg by mouth 2 times daily   Refills:  0       * rivaroxaban ANTICOAGULANT 20 MG Tabs tablet   Commonly known as:  XARELTO   This may have changed:  You were already taking a medication with the same name, and this prescription was added. Make sure you understand how and when to take each.   Used for:  Pulmonary emphysema, unspecified emphysema type (H)   Changed by:  Nelsy Thakkar APRN CNP        Dose:  20 mg   Take 1 tablet (20 mg) by mouth daily (with dinner)   Quantity:  30 tablet   Refills:  5       * Notice:  This list has 2 medication(s) that are the same as other medications prescribed for you. Read the directions carefully, and ask your doctor or other care provider to review them with you.         Where to get your medicines      These medications were sent to [facility] [place], [place] - [address]  [address],  [facility] [address]     Phone:  [phone]     losartan 25 MG tablet    nicotine 14 MG/24HR 24 hr patch    nicotine 7 MG/24HR 24 hr patch    rivaroxaban ANTICOAGULANT 20 MG Tabs tablet                Primary Care Provider Office Phone # Fax #    SANTOSH Jacobsen [phone]       [address]  [facility] [address]        Equal Access to Services     AMAURY SINGH : Hadii aad ku hadasho Soomaali, waaxda luqadaha, qaybta kaalmada adeegyada, waxay idiin hayaan adeeg kharash la'aan ah. So [facility] [phone].    ATENCIÓN: Si habla español, tiene a liz disposición servicios gratuitos de asistencia lingüística. Llame al [phone].    We comply with applicable federal civil rights laws and [place] laws. We do not discriminate on the basis of race, color, national origin, age, disability, sex, sexual orientation, or gender identity.            Thank you!     Thank you for choosing [facility]  for your care. Our goal is always to provide you with excellent care. Hearing back from our patients is one way we can continue to improve our services. Please take a few minutes to complete the written survey that you may receive in the mail after your visit with us. Thank you!             Your Updated Medication List - Protect others around you: Learn how to safely use, store and throw away your medicines at www.disposemymeds.org.          This list is accurate as of 9/25/18 10:00 AM.  Always use your most recent med list.                   Brand Name Dispense Instructions for use Diagnosis    losartan 25 MG tablet    COZAAR    90 tablet    Take 1 tablet (25 mg) by mouth daily    Hypertension, goal below 140/90       * nicotine 14 MG/24HR 24 hr patch    NICODERM CQ    14 patch    Place 1 patch onto the skin every 24 hours    Tobacco abuse       * nicotine 7 MG/24HR 24 hr patch    NICODERM CQ    14 patch    Place 1 patch onto the skin every 24 hours    Tobacco  abuse       * rivaroxaban ANTICOAGULANT 15 MG Tabs tablet    XARELTO     Take 15 mg by mouth 2 times daily        * rivaroxaban ANTICOAGULANT 20 MG Tabs tablet    XARELTO    30 tablet    Take 1 tablet (20 mg) by mouth daily (with dinner)    Pulmonary emphysema, unspecified emphysema type (H)       * Notice:  This list has 4 medication(s) that are the same as other medications prescribed for you. Read the directions carefully, and ask your doctor or other care provider to review them with you.

## 2018-09-25 NOTE — PATIENT INSTRUCTIONS
Taking 15 mg tablet Xarelto twice daily for 3 weeks  Then take 20 mg tablet Xarelto once daily for 6 months  I sent a prescription for 20 mg tablets to our clinic's pharmacy    I want you to see a hematologist (blood specialist). Please call [phone] to schedule. This is not urgent, ok to be done within the next few months    Wear 14 mg nicotine patch for 6-8 weeks, then go down to 7 mg patch for an additional 2 weeks. I sent these patches to our pharmacy    You may return to work Monday, October 1    If you have difficulty returning to work, please follow up with me in clinic next week    Otherwise, please follow up with me in February  If any concerns/problems arise in the meantime, you can see another provider in clinic while I'm on maternity leave

## 2018-09-25 NOTE — LETTER
[address] [address]  Phone: [phone]  Fax: [phone]    September 25, 2018        Orlando Chou  [address] [facility] [address]          To whom it may concern:    RE: Orlando Chou    Patient was seen and treated today at our clinic. He may return to work, Monday October 1, 2018.     Please contact me for questions or concerns.      Sincerely,        SANTOSH Scanlon CNP

## 2018-09-25 NOTE — PROGRESS NOTES
SUBJECTIVE:   Orlando Chou is a 57 year old male who presents to clinic today for the following health issues:      Hospital Follow-up Visit:    Hospital/Nursing Home/IP Rehab Facility: [facility]  Date of Admission: 9/18/18  Date of Discharge: 9/19/18  Reason(s) for Admission: PE    Started on Xarelto 15 mg BID. Will treat for 6 months. Recommend hematology consult.  Emphysema seen on CT  He has not smoked since hospitalization. He is using nicotine patch. 14 mg.             Problems taking medications regularly:  None       Medication changes since discharge: None       Problems adhering to non-medication therapy:  None    Summary [facility]  [facility]  Diagnostic Tests/Treatments reviewed.  Follow up needed: hematology consult  Other Healthcare Providers Involved in Patient s Care:         None  Update since discharge: improved.     Post Discharge Medication Reconciliation: discharge medications reconciled, continue medications without change.  Plan of care communicated with patient and family     Coding guidelines for this visit:  Type of Medical   Decision Making Face-to-Face Visit       within 7 Days of discharge Face-to-Face Visit        within 14 days of discharge   Moderate Complexity 10755 [id number]   High Complexity 37060 29411            Problem list and histories reviewed & adjusted, as indicated.  Additional history: none    Patient Active Problem List   Diagnosis     Chemical dependency (H)     Abnormal chest x-ray     Nicotine dependence     Other acute pulmonary embolism without acute cor pulmonale (H)     History reviewed. No pertinent surgical history.    Social History   Substance Use Topics     Smoking status: Former Smoker     Types: Cigarettes     Quit date: 9/19/2018     Smokeless tobacco: Current User     Alcohol use Yes     History reviewed. No pertinent family history.      Current Outpatient Prescriptions   Medication Sig Dispense  Refill     losartan (COZAAR) 25 MG tablet Take 1 tablet (25 mg) by mouth daily 30 tablet 1     rivaroxaban ANTICOAGULANT (XARELTO) 15 MG TABS tablet Take 15 mg by mouth 2 times daily       BP Readings from Last 3 Encounters:   09/25/18 128/78   09/17/18 158/74   08/31/18 134/72    Wt Readings from Last 3 Encounters:   09/25/18 162 lb (73.5 kg)   09/17/18 160 lb (72.6 kg)   08/31/18 153 lb 12 oz (69.7 kg)                    Reviewed and updated as needed this visit by clinical staff  Tobacco  Allergies  Meds  Med Hx  Surg Hx  Fam Hx  Soc Hx      Reviewed and updated as needed this visit by Provider         ROS:  Constitutional, HEENT, cardiovascular, pulmonary, gi and gu systems are negative, except as otherwise noted.    OBJECTIVE:     /78 (BP Location: Left arm, Patient Position: Chair, Cuff Size: Adult Regular)  Pulse 87  Temp 97.2  F (36.2  C) (Oral)  Wt 162 lb (73.5 kg)  SpO2 98%  BMI 25.7 kg/m2  Body mass index is 25.7 kg/(m^2).  GENERAL: healthy, alert and no distress  RESP: lungs clear to auscultation - no rales, rhonchi or wheezes  CV: regular rate and rhythm, normal S1 S2, no S3 or S4, no murmur, click or rub, no peripheral edema and peripheral pulses strong  ABDOMEN: soft, nontender, no hepatosplenomegaly, no masses and bowel sounds normal    Diagnostic Test Results:  none     ASSESSMENT/PLAN:       ICD-10-CM    1. Hospital discharge follow-up Z09    2. Other acute pulmonary embolism without acute cor pulmonale (H) I26.99 rivaroxaban ANTICOAGULANT (XARELTO) 15 MG TABS tablet     rivaroxaban ANTICOAGULANT (XARELTO) 20 MG TABS tablet     ONC/HEME ADULT REFERRAL   3. Pulmonary emphysema, unspecified emphysema type (H) J43.9    4. Hypertension, goal below 140/90 I10 losartan (COZAAR) 25 MG tablet   5. Tobacco abuse Z72.0 nicotine (NICODERM CQ) 14 MG/24HR 24 hr patch     nicotine (NICODERM CQ) 7 MG/24HR 24 hr patch     He is here today with his sister. Discharge instructions written and family  will help to translate. Also reviewed with phone   Reviewed consent to communicate with patient via phone . He consented and form signed  He is motivated to quit smoking. Reviewed appropriate use and step down instructions  At follow up we will do spirometry to assess COPD status.    Patient Instructions   Taking 15 mg tablet Xarelto twice daily for 3 weeks  Then take 20 mg tablet Xarelto once daily for 6 months  I sent a prescription for 20 mg tablets to our clinic's pharmacy    I want you to see a hematologist (blood specialist). Please call [phone] to schedule. This is not urgent, ok to be done within the next few months    Wear 14 mg nicotine patch for 6-8 weeks, then go down to 7 mg patch for an additional 2 weeks. I sent these patches to our pharmacy    You may return to work Monday, October 1    If you have difficulty returning to work, please follow up with me in clinic next week    Otherwise, please follow up with me in February  If any concerns/problems arise in the meantime, you can see another provider in clinic while I'm on maternity leave                  SANTOSH Scanlon CNP  [facility]

## 2019-04-22 ENCOUNTER — TELEPHONE (OUTPATIENT)
Dept: LAB | Facility: CLINIC | Age: 59
End: 2019-04-22

## 2019-04-22 NOTE — TELEPHONE ENCOUNTER
TC contacted patient via [employer] . Patient has not followed up since his hospitalization. He states he continues to take the medication that he was prescribed while hospitalized. TC did assist patient with scheduling an annual physical on 04/29. He states he will come fasting to that appointment.    Routing to PCP as EDWIGE .

## 2019-04-22 NOTE — TELEPHONE ENCOUNTER
Please help to schedule f/u. I last saw him Sep for hospital f/u after PE. Is he following at a different clinic? Also referred to hematology and I don't see that was scheduled.   Please help to schedule f/u at our clinic.  Could be regular office visit or annual exam

## 2019-04-29 ENCOUNTER — OFFICE VISIT (OUTPATIENT)
Dept: FAMILY MEDICINE | Facility: CLINIC | Age: 59
End: 2019-04-29
Payer: COMMERCIAL

## 2019-04-29 VITALS
TEMPERATURE: 97.7 F | BODY MASS INDEX: 28.09 KG/M2 | HEART RATE: 85 BPM | HEIGHT: 67 IN | SYSTOLIC BLOOD PRESSURE: 135 MMHG | WEIGHT: 179 LBS | DIASTOLIC BLOOD PRESSURE: 67 MMHG | OXYGEN SATURATION: 99 %

## 2019-04-29 DIAGNOSIS — Z11.59 NEED FOR HEPATITIS C SCREENING TEST: ICD-10-CM

## 2019-04-29 DIAGNOSIS — Z00.00 ENCOUNTER FOR PREVENTATIVE ADULT HEALTH CARE EXAMINATION: Primary | ICD-10-CM

## 2019-04-29 DIAGNOSIS — Z12.11 SCREEN FOR COLON CANCER: ICD-10-CM

## 2019-04-29 DIAGNOSIS — Z11.4 SCREENING FOR HIV (HUMAN IMMUNODEFICIENCY VIRUS): ICD-10-CM

## 2019-04-29 DIAGNOSIS — Z13.220 SCREENING FOR HYPERLIPIDEMIA: ICD-10-CM

## 2019-04-29 DIAGNOSIS — J43.9 PULMONARY EMPHYSEMA, UNSPECIFIED EMPHYSEMA TYPE (H): ICD-10-CM

## 2019-04-29 DIAGNOSIS — I10 HYPERTENSION, GOAL BELOW 140/90: ICD-10-CM

## 2019-04-29 DIAGNOSIS — I26.99 OTHER ACUTE PULMONARY EMBOLISM WITHOUT ACUTE COR PULMONALE (H): ICD-10-CM

## 2019-04-29 DIAGNOSIS — F10.29 ALCOHOL DEPENDENCE WITH UNSPECIFIED ALCOHOL-INDUCED DISORDER (H): ICD-10-CM

## 2019-04-29 PROCEDURE — 99396 PREV VISIT EST AGE 40-64: CPT | Performed by: NURSE PRACTITIONER

## 2019-04-29 RX ORDER — LOSARTAN POTASSIUM 25 MG/1
25 TABLET ORAL DAILY
Qty: 90 TABLET | Refills: 3 | Status: SHIPPED | OUTPATIENT
Start: 2019-04-29

## 2019-04-29 ASSESSMENT — PAIN SCALES - GENERAL: PAINLEVEL: NO PAIN (0)

## 2019-04-29 ASSESSMENT — MIFFLIN-ST. JEOR: SCORE: 1585.57

## 2019-04-29 NOTE — Clinical Note
Can TC please call patient with  to help schedule hematology consult (for history PE Sep 2018) and colonoscopy. I put in order that it is ok for patient to stop xarelto 2 days prior to colonoscopy

## 2019-04-29 NOTE — PROGRESS NOTES
SUBJECTIVE:   CC: Orlando Chou is an 58 year old male who presents for preventive health visit.     Healthy Habits:    Do you get at least three servings of calcium containing foods daily (dairy, green leafy vegetables, etc.)? yes    Amount of exercise or daily activities, outside of work: no, physical jpb    Problems taking medications regularly No    Medication side effects: No    Have you had an eye exam in the past two years? no    Do you see a dentist twice per year? no    Do you have sleep apnea, excessive snoring or daytime drowsiness?no    He had PE 2018  I saw him for hospital follow up and referred to hematology which was not done  He is on Xarelto  He quit smoking and drinking after the blood clot  CT showed emphysema  Denies cough, SOB  30+ pack year history        Today's PHQ-2 Score:   PHQ-2 (  [employer]) 2017   Q1: Little interest or pleasure in doing things 0   Q2: Feeling down, depressed or hopeless 0   PHQ-2 Score 0       Abuse: Current or Past(Physical, Sexual or Emotional)- No  Do you feel safe in your environment? Yes    Social History     Tobacco Use     Smoking status: Former Smoker     Types: Cigarettes     Last attempt to quit: 2018     Years since quittin.6     Smokeless tobacco: Current User   Substance Use Topics     Alcohol use: Yes     If you drink alcohol do you typically have >3 drinks per day or >7 drinks per week? No                      Last PSA: No results found for: PSA    Reviewed orders with patient. Reviewed health maintenance and updated orders accordingly - Yes  Labs reviewed in EPIC  BP Readings from Last 3 Encounters:   19 135/67   18 128/78   18 158/74    Wt Readings from Last 3 Encounters:   19 81.2 kg (179 lb)   18 73.5 kg (162 lb)   18 72.6 kg (160 lb)                  Patient Active Problem List   Diagnosis     Chemical dependency (H)     Abnormal chest x-ray     Nicotine dependence     Other acute  "pulmonary embolism without acute cor pulmonale (H)     Pulmonary emphysema, unspecified emphysema type (H)     History reviewed. No pertinent surgical history.    Social History     Tobacco Use     Smoking status: Former Smoker     Types: Cigarettes     Last attempt to quit: 2018     Years since quittin.6     Smokeless tobacco: Current User   Substance Use Topics     Alcohol use: Yes     History reviewed. No pertinent family history.      Current Outpatient Medications   Medication Sig Dispense Refill     losartan (COZAAR) 25 MG tablet Take 1 tablet (25 mg) by mouth daily 90 tablet 3     rivaroxaban ANTICOAGULANT (XARELTO) 20 MG TABS tablet Take 1 tablet (20 mg) by mouth daily (with dinner) 30 tablet 5       Reviewed and updated as needed this visit by clinical staff         Reviewed and updated as needed this visit by Provider        History reviewed. No pertinent past medical history.     ROS:  CONSTITUTIONAL: NEGATIVE for fever, chills, change in weight  INTEGUMENTARY/SKIN: NEGATIVE for worrisome rashes, moles or lesions  EYES: NEGATIVE for vision changes or irritation  ENT: NEGATIVE for ear, mouth and throat problems  RESP: NEGATIVE for significant cough or SOB  CV: NEGATIVE for chest pain, palpitations or peripheral edema  GI: NEGATIVE for nausea, abdominal pain, heartburn, or change in bowel habits   male: negative for dysuria, hematuria, decreased urinary stream, erectile dysfunction, urethral discharge  MUSCULOSKELETAL: NEGATIVE for significant arthralgias or myalgia  NEURO: NEGATIVE for weakness, dizziness or paresthesias  PSYCHIATRIC: NEGATIVE for changes in mood or affect    OBJECTIVE:   /67 (BP Location: Right arm, Patient Position: Chair, Cuff Size: Adult Regular)   Pulse 85   Temp 97.7  F (36.5  C) (Oral)   Ht 1.702 m (5' 7\")   Wt 81.2 kg (179 lb)   SpO2 99%   BMI 28.04 kg/m    EXAM:  GENERAL: healthy, alert and no distress  EYES: Eyes grossly normal to inspection, PERRL and " conjunctivae and sclerae normal  HENT: ear canals and TM's normal, nose and mouth without ulcers or lesions  NECK: no adenopathy, no asymmetry, masses, or scars and thyroid normal to palpation  RESP: lungs clear to auscultation - no rales, rhonchi or wheezes  CV: regular rate and rhythm, normal S1 S2, no S3 or S4, no murmur, click or rub, no peripheral edema and peripheral pulses strong  ABDOMEN: soft, nontender, no hepatosplenomegaly, no masses and bowel sounds normal  MS: no gross musculoskeletal defects noted, no edema  SKIN: no suspicious lesions or rashes  NEURO: Normal strength and tone, mentation intact and speech normal  PSYCH: mentation appears normal, affect normal/bright    Diagnostic Test Results:  none     ASSESSMENT/PLAN:   1. Encounter for preventative adult health care examination  - Basic metabolic panel; Future    2. Pulmonary emphysema, unspecified emphysema type (H)  - Stable    3. Alcohol dependence with unspecified alcohol-induced disorder (H)  - Applauded his sobriety    4. Other acute pulmonary embolism without acute cor pulmonale (H)  - Appears to be unprovoked which may necessitate lifelong use of NOAC. Referred to hematology for consult. May need lab workup. Will have our TC contact patient to help with scheduling  - ONC/HEME ADULT REFERRAL  - rivaroxaban ANTICOAGULANT (XARELTO) 20 MG TABS tablet; Take 1 tablet (20 mg) by mouth daily (with dinner)  Dispense: 30 tablet; Refill: 5    5. Need for hepatitis C screening test  - **Hepatitis C Screen Reflex to RNA FUTURE anytime; Future    6. Screen for colon cancer  - Ok to stop Xarelto 2 days prior to colonoscopy as he is now 6 months out from PE  - GASTROENTEROLOGY ADULT REF PROCEDURE ONLY [facility] [phone]    7. Screening for HIV (human immunodeficiency virus)  - **HIV Antigen Antibody Combo FUTURE anytime; Future    8. Hypertension, goal below 140/90  - Well controlled. Continue with same medication  - Basic metabolic panel;  "Future  - losartan (COZAAR) 25 MG tablet; Take 1 tablet (25 mg) by mouth daily  Dispense: 90 tablet; Refill: 3    9. Screening for hyperlipidemia  - Lipid panel reflex to direct LDL Fasting; Future    COUNSELING:  Reviewed preventive health counseling, as reflected in patient instructions       Regular exercise       Healthy diet/nutrition       Consider Hep C screening for patients born between 1945 and 1965       HIV screeninx in teen years, 1x in adult years, and at intervals if high risk       Colon cancer screening       Prostate cancer screening    BP Readings from Last 1 Encounters:   18 128/78     Estimated body mass index is 25.7 kg/m  as calculated from the following:    Height as of 18: 1.691 m (5' 6.58\").    Weight as of 18: 73.5 kg (162 lb).           reports that he quit smoking about 7 months ago. His smoking use included cigarettes. He uses smokeless tobacco.      Counseling Resources:  ATP IV Guidelines  Pooled Cohorts Equation Calculator  FRAX Risk Assessment  ICSI Preventive Guidelines  Dietary Guidelines for Americans, 2010  USDA's MyPlate  ASA Prophylaxis  Lung CA Screening    SANTOSH Scanlon CNP  [facility]    **TC to call and schedule appts  Let me know when seeing hematology  "

## 2019-04-30 ENCOUNTER — TELEPHONE (OUTPATIENT)
Dept: FAMILY MEDICINE | Facility: CLINIC | Age: 59
End: 2019-04-30

## 2019-04-30 DIAGNOSIS — I10 HYPERTENSION, GOAL BELOW 140/90: ICD-10-CM

## 2019-04-30 DIAGNOSIS — Z11.4 SCREENING FOR HIV (HUMAN IMMUNODEFICIENCY VIRUS): ICD-10-CM

## 2019-04-30 DIAGNOSIS — Z13.220 SCREENING FOR HYPERLIPIDEMIA: ICD-10-CM

## 2019-04-30 DIAGNOSIS — Z00.00 ENCOUNTER FOR PREVENTATIVE ADULT HEALTH CARE EXAMINATION: ICD-10-CM

## 2019-04-30 DIAGNOSIS — Z11.59 NEED FOR HEPATITIS C SCREENING TEST: ICD-10-CM

## 2019-04-30 LAB
ANION GAP SERPL CALCULATED.3IONS-SCNC: 5 MMOL/L (ref 3–14)
BUN SERPL-MCNC: 17 MG/DL (ref 7–30)
CALCIUM SERPL-MCNC: 9.3 MG/DL (ref 8.5–10.1)
CHLORIDE SERPL-SCNC: 105 MMOL/L (ref 94–109)
CHOLEST SERPL-MCNC: 166 MG/DL
CO2 SERPL-SCNC: 27 MMOL/L (ref 20–32)
CREAT SERPL-MCNC: 0.88 MG/DL (ref 0.66–1.25)
GFR SERPL CREATININE-BSD FRML MDRD: >90 ML/MIN/{1.73_M2}
GLUCOSE SERPL-MCNC: 103 MG/DL (ref 70–99)
HDLC SERPL-MCNC: 42 MG/DL
LDLC SERPL CALC-MCNC: 93 MG/DL
NONHDLC SERPL-MCNC: 124 MG/DL
POTASSIUM SERPL-SCNC: 4.3 MMOL/L (ref 3.4–5.3)
SODIUM SERPL-SCNC: 137 MMOL/L (ref 133–144)
TRIGL SERPL-MCNC: 155 MG/DL

## 2019-04-30 PROCEDURE — 80061 LIPID PANEL: CPT | Performed by: NURSE PRACTITIONER

## 2019-04-30 PROCEDURE — 36415 COLL VENOUS BLD VENIPUNCTURE: CPT | Performed by: NURSE PRACTITIONER

## 2019-04-30 PROCEDURE — 87389 HIV-1 AG W/HIV-1&-2 AB AG IA: CPT | Performed by: NURSE PRACTITIONER

## 2019-04-30 PROCEDURE — 80048 BASIC METABOLIC PNL TOTAL CA: CPT | Performed by: NURSE PRACTITIONER

## 2019-04-30 PROCEDURE — 86803 HEPATITIS C AB TEST: CPT | Performed by: NURSE PRACTITIONER

## 2019-04-30 NOTE — LETTER
[facility]  [address] [place]  [address]  [phone]        May 1, 2019    Orlando Chou  [address] [place] [place] [address]        Dear Orlando,    The results of your recent labs are enclosed.   Triglycerides and glucose are mildly elevated. Decrease consumption of processed sugars and saturated fats and increased exercise.   Hepatitis C and HIV screening was negative.   Please call the clinic if you have any concerns.     Results for orders placed or performed in visit on 04/30/19   **Hepatitis C Screen Reflex to RNA FUTURE anytime   Result Value Ref Range    Hepatitis C Antibody Nonreactive NR^Nonreactive   **HIV Antigen Antibody Combo FUTURE anytime   Result Value Ref Range    HIV Antigen Antibody Combo Nonreactive NR^Nonreactive       Basic metabolic panel   Result Value Ref Range    Sodium 137 133 - 144 mmol/L    Potassium 4.3 3.4 - 5.3 mmol/L    Chloride 105 94 - 109 mmol/L    Carbon Dioxide 27 20 - 32 mmol/L    Anion Gap 5 3 - 14 mmol/L    Glucose 103 (H) 70 - 99 mg/dL    Urea Nitrogen 17 7 - 30 mg/dL    Creatinine 0.88 0.66 - 1.25 mg/dL    GFR Estimate >90 >60 mL/min/[1.73_m2]    GFR Estimate If Black >90 >60 mL/min/[1.73_m2]    Calcium 9.3 8.5 - 10.1 mg/dL   Lipid panel reflex to direct LDL Fasting   Result Value Ref Range    Cholesterol 166 <200 mg/dL    Triglycerides 155 (H) <150 mg/dL    HDL Cholesterol 42 >39 mg/dL    LDL Cholesterol Calculated 93 <100 mg/dL    Non HDL Cholesterol 124 <130 mg/dL       If you have any questions please call the clinic at [phone].    Sincerely,    Nelsy FROST CNP  LMD

## 2019-04-30 NOTE — TELEPHONE ENCOUNTER
TC attempted to get a [facility]  to call patient. There was no [facility]  available.

## 2019-04-30 NOTE — TELEPHONE ENCOUNTER
Received the following message from Nelsy FROST CNP:    Can TC please call patient with  to help schedule hematology consult (for history PE Sep 2018) and colonoscopy. I put in order that it is ok for patient to stop xarelto 2 days prior to colonoscopy

## 2019-05-01 LAB
HCV AB SERPL QL IA: NONREACTIVE
HIV 1+2 AB+HIV1 P24 AG SERPL QL IA: NONREACTIVE

## 2019-05-01 NOTE — TELEPHONE ENCOUNTER
Message left for patient via [facility]  to have patient return call to the clinic regarding assisting scheduling appointments below.

## 2019-05-01 NOTE — RESULT ENCOUNTER NOTE
[address] [address]  Phone: [phone]      05/01/19    Orlando Chou  [address] [facility] [address]      Dear Orlando,    The results of your recent labs are enclosed.  Triglycerides and glucose are mildly elevated. Decrease consumption of processed sugars and saturated fats and increased exercise.  Hepatitis C and HIV screening was negative.   Please call the clinic if you have any concerns.    Sincerely,    SANTOSH Scanlon CNP    Your [facility] Care Team

## 2019-05-03 NOTE — TELEPHONE ENCOUNTER
Message left for patient via [facility]  to have patient return call to the clinic regarding assisting scheduling appointments below.

## 2019-05-10 NOTE — TELEPHONE ENCOUNTER
TC left patient a message to return call to clinic via [facility]  regarding message below. Routing to PCP to determine if she wants any further outreach to patient completed.

## 2019-07-01 ENCOUNTER — HOSPITAL ENCOUNTER (EMERGENCY)
Facility: CLINIC | Age: 59
Discharge: HOME | End: 2019-07-02
Attending: EMERGENCY MEDICINE | Admitting: EMERGENCY MEDICINE
Payer: COMMERCIAL

## 2019-07-01 DIAGNOSIS — F10.920 ALCOHOLIC INTOXICATION WITHOUT COMPLICATION (H): ICD-10-CM

## 2019-07-01 LAB
BASOPHILS # BLD AUTO: 0.1 10E9/L (ref 0–0.2)
BASOPHILS NFR BLD AUTO: 0.6 %
DIFFERENTIAL METHOD BLD: NORMAL
EOSINOPHIL # BLD AUTO: 0.4 10E9/L (ref 0–0.7)
EOSINOPHIL NFR BLD AUTO: 4.3 %
ERYTHROCYTE [DISTWIDTH] IN BLOOD BY AUTOMATED COUNT: 14.4 % (ref 10–15)
HCT VFR BLD AUTO: 43.5 % (ref 40–53)
HGB BLD-MCNC: 14.4 G/DL (ref 13.3–17.7)
IMM GRANULOCYTES # BLD: 0 10E9/L (ref 0–0.4)
IMM GRANULOCYTES NFR BLD: 0.2 %
LYMPHOCYTES # BLD AUTO: 3.1 10E9/L (ref 0.8–5.3)
LYMPHOCYTES NFR BLD AUTO: 32.2 %
MCH RBC QN AUTO: 30.4 PG (ref 26.5–33)
MCHC RBC AUTO-ENTMCNC: 33.1 G/DL (ref 31.5–36.5)
MCV RBC AUTO: 92 FL (ref 78–100)
MONOCYTES # BLD AUTO: 0.8 10E9/L (ref 0–1.3)
MONOCYTES NFR BLD AUTO: 8 %
NEUTROPHILS # BLD AUTO: 5.2 10E9/L (ref 1.6–8.3)
NEUTROPHILS NFR BLD AUTO: 54.7 %
NRBC # BLD AUTO: 0 10*3/UL
NRBC BLD AUTO-RTO: 0 /100
PLATELET # BLD AUTO: 186 10E9/L (ref 150–450)
RBC # BLD AUTO: 4.74 10E12/L (ref 4.4–5.9)
WBC # BLD AUTO: 9.5 10E9/L (ref 4–11)

## 2019-07-01 PROCEDURE — 85025 COMPLETE CBC W/AUTO DIFF WBC: CPT | Performed by: EMERGENCY MEDICINE

## 2019-07-01 PROCEDURE — 93005 ELECTROCARDIOGRAM TRACING: CPT | Performed by: EMERGENCY MEDICINE

## 2019-07-01 PROCEDURE — 84484 ASSAY OF TROPONIN QUANT: CPT | Performed by: EMERGENCY MEDICINE

## 2019-07-01 PROCEDURE — 93010 ELECTROCARDIOGRAM REPORT: CPT | Mod: Z6 | Performed by: EMERGENCY MEDICINE

## 2019-07-01 PROCEDURE — 80320 DRUG SCREEN QUANTALCOHOLS: CPT | Performed by: EMERGENCY MEDICINE

## 2019-07-01 PROCEDURE — 80048 BASIC METABOLIC PNL TOTAL CA: CPT | Performed by: EMERGENCY MEDICINE

## 2019-07-01 PROCEDURE — 99283 EMERGENCY DEPT VISIT LOW MDM: CPT | Mod: 25 | Performed by: EMERGENCY MEDICINE

## 2019-07-01 PROCEDURE — 99284 EMERGENCY DEPT VISIT MOD MDM: CPT | Performed by: EMERGENCY MEDICINE

## 2019-07-01 NOTE — ED AVS SNAPSHOT
[facility], [place], Emergency Department  [address] [place] [id number]  Phone:  [phone]                                    Orlando Chou   MRN: [id number]    Department:  [facility], Emergency Department   Date of Visit:  7/1/2019           After Visit Summary Signature Page    I have received my discharge instructions, and my questions have been answered. I have discussed any challenges I see with this plan with the nurse or doctor.    ..........................................................................................................................................  Patient/Patient Representative Signature      ..........................................................................................................................................  Patient Representative Print Name and Relationship to Patient    ..................................................               ................................................  Date                                   Time    ..........................................................................................................................................  Reviewed by Signature/Title    ...................................................              ..............................................  Date                                               Time          22EPIC Rev 08/18

## 2019-07-02 ENCOUNTER — APPOINTMENT (OUTPATIENT)
Dept: GENERAL RADIOLOGY | Facility: CLINIC | Age: 59
End: 2019-07-02
Attending: EMERGENCY MEDICINE
Payer: COMMERCIAL

## 2019-07-02 VITALS
TEMPERATURE: 98.4 F | RESPIRATION RATE: 18 BRPM | OXYGEN SATURATION: 95 % | DIASTOLIC BLOOD PRESSURE: 76 MMHG | SYSTOLIC BLOOD PRESSURE: 136 MMHG | HEART RATE: 89 BPM

## 2019-07-02 LAB
ANION GAP SERPL CALCULATED.3IONS-SCNC: 10 MMOL/L (ref 3–14)
BUN SERPL-MCNC: 9 MG/DL (ref 7–30)
CALCIUM SERPL-MCNC: 8.3 MG/DL (ref 8.5–10.1)
CHLORIDE SERPL-SCNC: 107 MMOL/L (ref 94–109)
CO2 SERPL-SCNC: 23 MMOL/L (ref 20–32)
CREAT SERPL-MCNC: 0.75 MG/DL (ref 0.66–1.25)
ETHANOL SERPL-MCNC: 0.34 G/DL
GFR SERPL CREATININE-BSD FRML MDRD: >90 ML/MIN/{1.73_M2}
GLUCOSE SERPL-MCNC: 109 MG/DL (ref 70–99)
INTERPRETATION ECG - MUSE: NORMAL
POTASSIUM SERPL-SCNC: 3.3 MMOL/L (ref 3.4–5.3)
SODIUM SERPL-SCNC: 140 MMOL/L (ref 133–144)
TROPONIN I SERPL-MCNC: <0.015 UG/L (ref 0–0.04)

## 2019-07-02 PROCEDURE — 71046 X-RAY EXAM CHEST 2 VIEWS: CPT

## 2019-07-02 ASSESSMENT — ENCOUNTER SYMPTOMS
SORE THROAT: 0
DIARRHEA: 0
RHINORRHEA: 0
SHORTNESS OF BREATH: 0
NECK STIFFNESS: 0
SLEEP DISTURBANCE: 0
CHEST TIGHTNESS: 0
FEVER: 0
CHILLS: 0
NECK PAIN: 0
NERVOUS/ANXIOUS: 0
VOMITING: 0
NAUSEA: 0
HEADACHES: 0
PALPITATIONS: 0

## 2019-07-02 NOTE — DISCHARGE INSTRUCTIONS
Discharge Instructions  Alcohol Intoxication    You have been seen today with alcohol intoxication. This means that you have enough alcohol in your system to impair your ability to mentally and physically function. When you are intoxicated, we are not allowed to release you without a sober adult to be with you. You may not drive, operate dangerous equipment, or do anything else dangerous until you are sober.    You may have come to the Emergency Department because of your intoxication, or for another reason, such as because of an injury. No matter what the case is, this visit is a  red flag  regarding alcohol use, and you should consider whether your drinking pattern is a problem for you.     You may be at risk for alcohol-related problems if:    Men: you drink more than 14 drinks per week, or more than 4 drinks per occasion.    Women: you drink more than 7 drinks per week or more than 3 drinks per occasion.    You have black-outs.  You do things you regret while drinking.  You have legal problems because of drinking (DUI).  You have job problems because of drinking (you call in sick to work because of drinking).    CAGE Questions  Have you ever felt you should cut down on your drinking?  Have people annoyed you by criticizing your drinking?  Have you ever felt bad or guilty about your drinking?  Have you ever had a drink first thing in the morning to steady your nerves or get rid of a hangover (eye opener)?    If you answer yes to any of the CAGE questions, you may have a problem with alcohol.      Return to the Emergency Department if:  You become shaky or tremble when you try to stop drinking.    You have a seizure or pass out.    You throw up (vomit) blood. This may be bright red or it may look like black coffee grounds.    You have blood in the stool. This may be bright red or appear as a black, tarry, bad smelling stool.    You become lightheaded or faint.      For further help, contact:   Your caregiver.     Alcoholics Anonymous (AA).    A drug or alcohol rehabilitation program.    You can get information on alcohol resources and groups by calling the number 211 or 1-497.466.2083 on any phone.       Seek medical care if:  You have persistent vomiting.    You have persistent pain in any part of your body.    You do not feel better after a few days.    If you were given a prescription for medicine here today, be sure to read all of the information (including the package insert) that comes with your prescription.  This will include important information about the medicine, its side effects, and any warnings that you need to know about.  The pharmacist who fills the prescription can provide more information and answer questions you may have about the medicine.  If you have questions or concerns that the pharmacist cannot address, please call or return to the Emergency Department.   Remember that you can always come back to the Emergency Department if you are not able to see your regular doctor in the amount of time listed above, if you get any new symptoms, or if there is anything that worries you.

## 2019-07-02 NOTE — ED PROVIDER NOTES
History     Chief Complaint   Patient presents with     Chest Pain     Alcohol Intoxication     HPI  Orlando Chou is a 58 year old male who has a past medical history of chemical dependency presenting with alcohol intoxication.  Patient has been drinking today.  He says a lot and does not have much.  Denies any pain, said he never had pain today.  Denies shortness of breath to me.  Denies fevers and chills.  He is taking his medications including his Xarelto.  Denies any discomfort right now would like to sleep.    I have reviewed the Medications, Allergies, Past Medical and Surgical History, and Social History in the Epic system.    Review of Systems   Constitutional: Negative for chills and fever.   HENT: Negative for rhinorrhea and sore throat.    Respiratory: Negative for chest tightness and shortness of breath.    Cardiovascular: Negative for chest pain and palpitations.   Gastrointestinal: Negative for diarrhea, nausea and vomiting.   Musculoskeletal: Negative for neck pain and neck stiffness.   Neurological: Negative for syncope and headaches.   Psychiatric/Behavioral: Negative for sleep disturbance and suicidal ideas. The patient is not nervous/anxious.    All other systems reviewed and are negative.      Physical Exam   BP: (!) 168/99  Pulse: 99  Temp: 98.4  F (36.9  C)  Resp: 18  SpO2: 96 %      Physical Exam  Physical Exam   Constitutional: oriented to person, place, and time. appears well-developed and well-nourished.   HENT:   Head: Normocephalic and atraumatic. No tenderness palpation over the cranium.  Neck: Normal range of motion. Range of motion intact.  No C-spine tenderness palpation.  Pulmonary/Chest: Effort normal. No respiratory distress.   Cardiac: No murmurs, rubs, gallops. RRR.  Abdominal: Abdomen soft, nontender, nondistended. No rebound tenderness.  MSK: Long bones without deformity or evidence of trauma  Neurological: alert and oriented to person, place, and time. Moving all  extremities.  No facial droop.  Skin: Skin is warm and dry.   Psychiatric: Appears intoxicated     ED Course        Procedures             EKG Interpretation:      Interpreted by Shmuel Meyer  Time reviewed: 0058  Symptoms at time of EKG: intoxicated   Rhythm: normal sinus   Rate: normal  Axis: RAD  Ectopy: none  Conduction: normal  ST Segments/ T Waves: No ST-T wave changes  Q Waves: none  Comparison to prior: No old EKG available    Clinical Impression: normal EKG        Labs Ordered and Resulted from Time of ED Arrival Up to the Time of Departure from the ED   BASIC METABOLIC PANEL - Abnormal; Notable for the following components:       Result Value    Potassium 3.3 (*)     Glucose 109 (*)     Calcium 8.3 (*)     All other components within normal limits   ALCOHOL ETHYL - Abnormal; Notable for the following components:    Ethanol g/dL 0.34 (*)     All other components within normal limits   CBC WITH PLATELETS DIFFERENTIAL   TROPONIN I            Assessments & Plan (with Medical Decision Making)   MDM  Patient presented with alcohol intoxication.  He mentioned to the nurse that he had chest pain however he denies this to me, he denies chest pain currently or ever today.  EKG shows right axis deviation however no signs of ischemia.  He does have history of pulmonary embolism, he is been taking his Xarelto, vitals are stable and he is no shortness of breath.  Initial troponin is negative.  Labs otherwise largely unremarkable except for elevated ethanol at 0.34.  Patient will be observed until sober and discharged.    Re evaluation: Patient is clinically sober and ambulating without difficulty.  He is tolerating p.o. intake.  Patient will be given a bus token to get home.  Patient agrees this plan.  No signs of acute withdrawal at this point    I have reviewed the nursing notes.    I have reviewed the findings, diagnosis, plan and need for follow up with the patient.       Medication List      There are no  discharge medications for this visit.         Final diagnoses:   Alcoholic intoxication without complication (H)       7/1/2019   [facility], [place], EMERGENCY DEPARTMENT     Shmuel Meyer MD  07/02/19 0597

## 2019-07-02 NOTE — ED TRIAGE NOTES
Pt presents to ER for chest pain and ETOH. Pt states he has sharp pain earlier today in his chest followed by a  fall. Pt denies chest pain currently. Pt states he has been drinking but is unable to determine how much.

## 2019-10-16 ENCOUNTER — APPOINTMENT (OUTPATIENT)
Dept: CT IMAGING | Facility: CLINIC | Age: 59
End: 2019-10-16
Attending: FAMILY MEDICINE
Payer: COMMERCIAL

## 2019-10-16 ENCOUNTER — HOSPITAL ENCOUNTER (EMERGENCY)
Facility: CLINIC | Age: 59
Discharge: HOME | End: 2019-10-16
Attending: FAMILY MEDICINE | Admitting: FAMILY MEDICINE
Payer: COMMERCIAL

## 2019-10-16 VITALS
HEART RATE: 107 BPM | RESPIRATION RATE: 16 BRPM | DIASTOLIC BLOOD PRESSURE: 80 MMHG | SYSTOLIC BLOOD PRESSURE: 120 MMHG | OXYGEN SATURATION: 96 % | TEMPERATURE: 96.1 F

## 2019-10-16 DIAGNOSIS — F10.220 ALCOHOL DEPENDENCE WITH UNCOMPLICATED INTOXICATION (H): ICD-10-CM

## 2019-10-16 DIAGNOSIS — R10.9 STOMACH ACHE: ICD-10-CM

## 2019-10-16 DIAGNOSIS — F10.229 ALCOHOL DEPENDENCE WITH INTOXICATION WITH COMPLICATION (H): ICD-10-CM

## 2019-10-16 DIAGNOSIS — M79.604 LEG PAIN, BILATERAL: ICD-10-CM

## 2019-10-16 DIAGNOSIS — M54.50 LUMBAGO: ICD-10-CM

## 2019-10-16 DIAGNOSIS — M79.605 LEG PAIN, BILATERAL: ICD-10-CM

## 2019-10-16 LAB
ALBUMIN SERPL-MCNC: 3.5 G/DL (ref 3.4–5)
ALCOHOL BREATH TEST: 0.24 (ref 0–0.01)
ALP SERPL-CCNC: 112 U/L (ref 40–150)
ALT SERPL W P-5'-P-CCNC: 112 U/L (ref 0–70)
ANION GAP SERPL CALCULATED.3IONS-SCNC: 14 MMOL/L (ref 3–14)
AST SERPL W P-5'-P-CCNC: 122 U/L (ref 0–45)
BASOPHILS # BLD AUTO: 0 10E9/L (ref 0–0.2)
BASOPHILS NFR BLD AUTO: 0.3 %
BILIRUB SERPL-MCNC: 0.7 MG/DL (ref 0.2–1.3)
BUN SERPL-MCNC: 13 MG/DL (ref 7–30)
CALCIUM SERPL-MCNC: 8.4 MG/DL (ref 8.5–10.1)
CHLORIDE SERPL-SCNC: 101 MMOL/L (ref 94–109)
CO2 SERPL-SCNC: 23 MMOL/L (ref 20–32)
CREAT SERPL-MCNC: 1.01 MG/DL (ref 0.66–1.25)
CRP SERPL-MCNC: <2.9 MG/L (ref 0–8)
DIFFERENTIAL METHOD BLD: ABNORMAL
EOSINOPHIL # BLD AUTO: 0.2 10E9/L (ref 0–0.7)
EOSINOPHIL NFR BLD AUTO: 1.5 %
ERYTHROCYTE [DISTWIDTH] IN BLOOD BY AUTOMATED COUNT: 13 % (ref 10–15)
ERYTHROCYTE [SEDIMENTATION RATE] IN BLOOD BY WESTERGREN METHOD: 8 MM/H (ref 0–20)
GFR SERPL CREATININE-BSD FRML MDRD: 80 ML/MIN/{1.73_M2}
GLUCOSE SERPL-MCNC: 126 MG/DL (ref 70–99)
HCT VFR BLD AUTO: 51.3 % (ref 40–53)
HGB BLD-MCNC: 17.3 G/DL (ref 13.3–17.7)
IMM GRANULOCYTES # BLD: 0 10E9/L (ref 0–0.4)
IMM GRANULOCYTES NFR BLD: 0.3 %
LIPASE SERPL-CCNC: 168 U/L (ref 73–393)
LYMPHOCYTES # BLD AUTO: 2.2 10E9/L (ref 0.8–5.3)
LYMPHOCYTES NFR BLD AUTO: 18.7 %
MCH RBC QN AUTO: 32.5 PG (ref 26.5–33)
MCHC RBC AUTO-ENTMCNC: 33.7 G/DL (ref 31.5–36.5)
MCV RBC AUTO: 96 FL (ref 78–100)
MONOCYTES # BLD AUTO: 0.9 10E9/L (ref 0–1.3)
MONOCYTES NFR BLD AUTO: 7.8 %
NEUTROPHILS # BLD AUTO: 8.4 10E9/L (ref 1.6–8.3)
NEUTROPHILS NFR BLD AUTO: 71.4 %
NRBC # BLD AUTO: 0 10*3/UL
NRBC BLD AUTO-RTO: 0 /100
PLATELET # BLD AUTO: 122 10E9/L (ref 150–450)
POTASSIUM SERPL-SCNC: 3.2 MMOL/L (ref 3.4–5.3)
PROT SERPL-MCNC: 8.5 G/DL (ref 6.8–8.8)
RBC # BLD AUTO: 5.33 10E12/L (ref 4.4–5.9)
SODIUM SERPL-SCNC: 138 MMOL/L (ref 133–144)
WBC # BLD AUTO: 11.8 10E9/L (ref 4–11)

## 2019-10-16 PROCEDURE — 82075 ASSAY OF BREATH ETHANOL: CPT

## 2019-10-16 PROCEDURE — 83690 ASSAY OF LIPASE: CPT | Performed by: FAMILY MEDICINE

## 2019-10-16 PROCEDURE — 25000128 H RX IP 250 OP 636: Performed by: FAMILY MEDICINE

## 2019-10-16 PROCEDURE — 25000132 ZZH RX MED GY IP 250 OP 250 PS 637: Performed by: FAMILY MEDICINE

## 2019-10-16 PROCEDURE — 74177 CT ABD & PELVIS W/CONTRAST: CPT

## 2019-10-16 PROCEDURE — 96365 THER/PROPH/DIAG IV INF INIT: CPT

## 2019-10-16 PROCEDURE — 85652 RBC SED RATE AUTOMATED: CPT | Performed by: FAMILY MEDICINE

## 2019-10-16 PROCEDURE — 99285 EMERGENCY DEPT VISIT HI MDM: CPT | Mod: 25

## 2019-10-16 PROCEDURE — 99284 EMERGENCY DEPT VISIT MOD MDM: CPT | Mod: GC | Performed by: FAMILY MEDICINE

## 2019-10-16 PROCEDURE — 96361 HYDRATE IV INFUSION ADD-ON: CPT

## 2019-10-16 PROCEDURE — 80053 COMPREHEN METABOLIC PANEL: CPT | Performed by: FAMILY MEDICINE

## 2019-10-16 PROCEDURE — 85025 COMPLETE CBC W/AUTO DIFF WBC: CPT | Performed by: FAMILY MEDICINE

## 2019-10-16 PROCEDURE — 25800025 ZZH RX 258: Performed by: FAMILY MEDICINE

## 2019-10-16 PROCEDURE — 86140 C-REACTIVE PROTEIN: CPT | Performed by: FAMILY MEDICINE

## 2019-10-16 PROCEDURE — 96366 THER/PROPH/DIAG IV INF ADDON: CPT

## 2019-10-16 PROCEDURE — 25000125 ZZHC RX 250: Performed by: FAMILY MEDICINE

## 2019-10-16 RX ORDER — POTASSIUM CL/LIDO/0.9 % NACL 10MEQ/0.1L
10 INTRAVENOUS SOLUTION, PIGGYBACK (ML) INTRAVENOUS ONCE
Status: DISCONTINUED | OUTPATIENT
Start: 2019-10-16 | End: 2019-10-16

## 2019-10-16 RX ORDER — POTASSIUM CHLORIDE 1.5 G/1.58G
20 POWDER, FOR SOLUTION ORAL ONCE
Status: COMPLETED | OUTPATIENT
Start: 2019-10-16 | End: 2019-10-16

## 2019-10-16 RX ORDER — IOPAMIDOL 755 MG/ML
100 INJECTION, SOLUTION INTRAVASCULAR ONCE
Status: COMPLETED | OUTPATIENT
Start: 2019-10-16 | End: 2019-10-16

## 2019-10-16 RX ADMIN — FOLIC ACID: 5 INJECTION, SOLUTION INTRAMUSCULAR; INTRAVENOUS; SUBCUTANEOUS at 13:03

## 2019-10-16 RX ADMIN — SODIUM CHLORIDE 62 ML: 9 INJECTION, SOLUTION INTRAVENOUS at 14:55

## 2019-10-16 RX ADMIN — SODIUM CHLORIDE 1000 ML: 9 INJECTION, SOLUTION INTRAVENOUS at 15:43

## 2019-10-16 RX ADMIN — IOPAMIDOL 88 ML: 755 INJECTION, SOLUTION INTRAVENOUS at 14:53

## 2019-10-16 RX ADMIN — POTASSIUM CHLORIDE 20 MEQ: 1.5 POWDER, FOR SOLUTION ORAL at 15:01

## 2019-10-16 ASSESSMENT — ENCOUNTER SYMPTOMS
UNEXPECTED WEIGHT CHANGE: 0
NAUSEA: 1
BACK PAIN: 1
BLOOD IN STOOL: 0
SHORTNESS OF BREATH: 0
ABDOMINAL PAIN: 0
APPETITE CHANGE: 1
DIAPHORESIS: 1
COUGH: 0
CHILLS: 1
FEVER: 0
HEMATURIA: 0

## 2019-10-16 NOTE — ED NOTES
Bed: HW01  Expected date: 10/16/19  Expected time: 10:51 AM  Means of arrival:   Comments:  [id number] ETOH 58 M

## 2019-10-16 NOTE — ED PROVIDER NOTES
History     Chief Complaint   Patient presents with     Alcohol Intoxication     brought in by ambulance,pt seeking detox     HPI (History is limited by patient's alcohol intoxication and communication barrier. Attempted to use Tibetan  and then patient stated he can communicate in English.)  Orlando Chou is a 58 year old man with a medical history significant for alcohol use disorder, pulmonary embolism, and latent TB who presented to the ED by ambulance. He reports that his roommate became upset with him because he (the patient) has been drinking and not keeping his room clean. He states that he drinks alcohol so that he can walk despite his back and leg pain. He states that his back has been hurting for about the past 1-3 weeks. He denies recent falls or trauma. He thinks that it might be due to his bed. Mr. Chou points to his bilateral upper buttock regions as the source of his pain. He describes his leg pain as bilateral burning sensation that is present 90% of the time and extends from his knees to his toes. He also reports nausea and decreased appetite. He states that he is only able to consume fluids and soups.    On review of systems he reports no recent head trauma, odynophagia, dysphagia, hematemesis, emesis, melena, hematochezia, or hematuria. He states that he will have cold sweats occasionally, as well as intermittent coughing. He does feel that his abdomen has gotten larger. He denies fevers.    Mr. Chou reports that he was abstinent of alcohol until the beginning of October. He cites having three children living in [place] and a low-paying job as sources of stress that contributed to him starting to drink alcohol again. He has had beer every day for the past 10-15 days but is unable to quantify the amount. He denies thoughts of wanting to be dead.     I have reviewed the Medications, Allergies, Past Medical and Surgical History, and Social History in the Epic system.    Review of  Systems   Constitutional: Positive for appetite change, chills and diaphoresis. Negative for fever and unexpected weight change.   Respiratory: Negative for cough and shortness of breath.    Cardiovascular: Negative for chest pain.   Gastrointestinal: Positive for nausea. Negative for abdominal pain and blood in stool.   Genitourinary: Negative for hematuria.   Musculoskeletal: Positive for back pain.   Limited by patient's alcohol intoxication.    Physical Exam   BP: 124/78  Heart Rate: 98  Temp: 96  F (35.6  C)  Resp: 18  SpO2: 97 %    Physical Exam  Constitutional:       General: He is not in acute distress.     Appearance: He is normal weight. He is not ill-appearing, toxic-appearing or diaphoretic.   HENT:      Head: Normocephalic and atraumatic.      Nose: Nose normal. No rhinorrhea.      Mouth/Throat:      Mouth: Mucous membranes are moist.      Dentition: Abnormal dentition.   Eyes:      Comments: Both pupils reactive to light.   Cardiovascular:      Rate and Rhythm: Normal rate and regular rhythm.      Comments: No murmurs, rubs, or gallops appreciated on auscultation.  Pulmonary:      Effort: No respiratory distress.      Comments: Clear breath sounds in anterior and posterior lung fields.  Abdominal:      General: There is distension.      Palpations: There is no mass.      Tenderness: There is no tenderness. There is no guarding.   Musculoskeletal:      Cervical back: He exhibits no tenderness and no bony tenderness.        Thoracic back: He exhibits no tenderness and no bony tenderness.      Lumbar back: He exhibits no tenderness and no bony tenderness.   Skin:     General: Skin is warm.   Neurological:      Gait: Gait is intact.      Comments: Intoxicated but mostly able to follow directions. Palpable patellar and ankle reflexes bilaterally. 5/5 strength bilaterally with hip flexion, knee flexion and extension, ankle dorsiflexion and plantarflexion. Sensation intact on lower extremities between knees  and ankles (clothing covering rest of lower extremities).     Exam is limited by patient's degree of alcohol intoxication.    ED Course     Procedures: none    Critical Care time:  none    Labs Ordered and Resulted from Time of ED Arrival Up to the Time of Departure from the ED   CBC WITH PLATELETS DIFFERENTIAL - Abnormal; Notable for the following components:       Result Value    WBC 11.8 (*)     Platelet Count 122 (*)     Absolute Neutrophil 8.4 (*)     All other components within normal limits   COMPREHENSIVE METABOLIC PANEL - Abnormal; Notable for the following components:    Potassium 3.2 (*)     Glucose 126 (*)     Calcium 8.4 (*)      (*)      (*)     All other components within normal limits   ALCOHOL BREATH TEST POCT - Abnormal; Notable for the following components:    Alcohol Breath Test 0.237 (*)     All other components within normal limits   LIPASE   CRP INFLAMMATION   ERYTHROCYTE SEDIMENTATION RATE AUTO   DRUG ABUSE SCREEN 6 CHEM DEP URINE ([facility])       Assessments & Plan (with Medical Decision Making)   Mr. Chou is a 58-year-old man with a history of pulmonary embolism, latent TB, and alcohol use disorder who presented to the ED from home with alcohol intoxication. He also mentions back, leg, and abdominal pain, as well as nausea.      #Alcohol intoxication  Mr. Chou presented with alcohol intoxication and was found to have a level of 0.237 on breath test. He was hemodynamically stable and able to protect his airway, however he did have difficulty participating in the interview and physical examination. He received 1 liter of normal saline as well as 1 liter of dextrose 5% and 0.45% NaCl combined with thiamine and folic acid. He was found to have low potassium (K 3.2) therefore he was also provided with 20 mEq oral KCl.  Once Mr. Chou's intoxication is resolved he can either discharge or go to detox. We are attempting to get him a bed at detox here however if this is not  available, he would prefer to not go to [facility] detox.    #Back pain  Serious etiologies to rule out include epidural abscess, spinal cord compression, metastatic cancer, osteomyelitis, and skeletal TB. The patient was treated for TB in 2000 although also received the BCG vaccine. He denies trauma and is able to ambulate. He does not report unexplained weight loss. He is afebrile and chart review does not show that he is currently taking steroids. White count is slightly elevated. There is no midline tenderness along the spine or deficits in lower extremity strength, reflexes, and sensation. Pain is not reproducible with palpation. Given the reassuring  physical exam (no neurologic deficits or midline tenderness) and laboratory findings (normal ESR and CRP) combined with the patient's report that he feels this is due to his bed, it is likely muscular in etiology. If this back pain persists, the patient should plan to follow up with his primary care physician.     #Leg pain  Neurologically intact (strength, sensation, and reflexes) based on exam limited by patient cooperation. Sounds neuropathic in nature (patient describes burning sensation), which due to chronic alcohol use. Mr. Chou was able to ambulate independently. Mr. Chou should follow-up with his outpatient provider to further evaluate the etiology of this leg pain.     #Abdominal pain  #Nausea  Differential diagnosis includes diverticulosis, pancreatitis, viral gastroenteritis, alcoholic gastritis, and hepatitis. Hepatitis and pancreatitis are unlikely in the setting of only mildly elevated liver enzymes and normal lipase. Viral gastroenteritis would fit the timeline (a few days) as well as the nausea, decreased appetite, and diffuse abdominal pain. Alcoholic gastritis could also explain the diffuse abdominal pain in the setting of chronic alcohol use. Abdominal CT without contrast did not show any acute intraabdominal pathology.     I have reviewed  the nursing notes.    New Prescriptions    No medications on file       Final diagnoses:   Alcohol intoxication (H)       10/16/2019   [facility], [place], EMERGENCY DEPARTMENT    Joy De León MD  Psychiatry Resident, PGY1    This data collected with the Resident working in the Emergency Department.  Patient was seen and evaluated by myself and I repeated the history and physical exam with the patient.  The plan of care was discussed with them.  The key portions of the note including the entire assessment and plan reflect my documentation.  Patient with a history of chronic alcoholism presented after he was felt to be too intoxicated at home and unable to care for himself.  Initially complained of some back and abdominal pain, extensive work-up outlined above does not reveal any life-threatening cause of his acute pain and he has no red flags for any other acute medical condition besides alcoholism.  Ultimately we suggested he be admitted to detox.  He stated he would agree to be admitted to the detox here but was not interested in community options.  We are currently waiting to see if a bed will become available, if not he can be discharged once clinically sober with resources.       Tomas Long MD  10/16/19 [id number]

## 2019-10-16 NOTE — ED NOTES
Patient seen by Dr. Long.  Waiting for a detox bed.  Patient now clinically sober.  No beds available here.  Patient feels ok going home.  Has been reassessed and is clinically sober.  /66   Temp 96.1  F (35.6  C) (Oral)   Resp 16   SpO2 95%        Monster Cornell MD  10/16/19 7331

## 2019-10-16 NOTE — DISCHARGE INSTRUCTIONS
Thank you for choosing [facility].     Please closely monitor for further symptoms. Return to the Emergency Department if you develop any new or worsening signs or symptoms.    If you received any opiate pain medications or sedatives during your visit, please do not drive for at least 8 hours.     Labs, cultures or final xray interpretations may still need to be reviewed.  We will call you if your plan of care needs to be changed.    Please follow up with your primary care physician or clinic as soon as possible.    To check the status of detox bed availability at [facility] call:  [phone]  To check the status of detox availability at [facility] call:  [phone]  To check the status of detox bed availability at [address] [place] [address] call:  [phone]  If you desire chemical dependency assessment or counseling, follow up with [facility]: [phone]

## 2019-10-16 NOTE — ED AVS SNAPSHOT
[facility], [place], Emergency Department  [address]  [facility] [address]  Phone:  [phone]  Fax:  [phone]                                    Orlando Chou   MRN: [id number]    Department:  [facility], Emergency Department   Date of Visit:  10/16/2019           After Visit Summary Signature Page    I have received my discharge instructions, and my questions have been answered. I have discussed any challenges I see with this plan with the nurse or doctor.    ..........................................................................................................................................  Patient/Patient Representative Signature      ..........................................................................................................................................  Patient Representative Print Name and Relationship to Patient    ..................................................               ................................................  Date                                   Time    ..........................................................................................................................................  Reviewed by Signature/Title    ...................................................              ..............................................  Date                                               Time          22EPIC Rev 08/18        no

## 2019-10-17 ENCOUNTER — PATIENT OUTREACH (OUTPATIENT)
Dept: CARE COORDINATION | Facility: CLINIC | Age: 59
End: 2019-10-17

## 2019-10-17 DIAGNOSIS — Z71.89 OTHER SPECIFIED COUNSELING: ICD-10-CM

## 2019-10-17 NOTE — ED NOTES
Patient alert/oriented. Stable on feet. AVS reviewed with Bharti MEDEL. Belongings returned. Patient safe to discharge home.

## 2019-10-17 NOTE — PROGRESS NOTES
Clinic Care Coordination Contact  [facility]/Voicemail    Referral Source: ED follow up  Clinical Data: Care Coordinator Outreach.  Referral for alcohol use and resources. Patient at [facility] ED yesterday for alcohol use.  Per ED report, Patient reported his roommate was upset as he was drinking and not keeping his room clean.  Patient reported he drinks she he can walk as has pain in legs and back.  He reports no recent falls or trauma.  Patient reported being sober until beginning of October noting his stressors as three children in [place] and having a low paying job.  He reported has been drinking beer every day for past 10-15 days.  He reported decreased appetite and nausea.  Patient reports bilateral leg pain, burning, present 90% of the time, extending from knees to toes.      SW attempted to call Patient via [place] .  No Tibetan interpreters were available at this time.    ED notes indicated that Patient was provided a Tibetan  but spoke English in the ED.      Plan: Care Coordinator sent care coordination introduction letter today via mail. Care Coordinator will try to reach patient again in 1-2 business days.    ANTONETTE Bennett, MSW   [facility]   [phone]  10/17/2019 5:16 PM

## 2019-10-17 NOTE — LETTER
[facility] CARE COORDINATION  [address]  [facility] [place] [address]  October 17, 2019    Orlando Chou  [address] [place] [address]      Dear Orlando,    I am a clinic care coordinator who works with SANTOSH Scanlon CNP at [facility].  I wanted to introduce myself and provide you with my contact information so that you can call me with questions or concerns about your health care. Below is a description of clinic care coordination and how I can further assist you.     The clinic care coordinator is a registered nurse and/or  who understand the health care system. The goal of clinic care coordination is to help you manage your health and improve access to the [facility] system in the most efficient manner. The registered nurse can assist you in meeting your health care goals by providing education, coordinating services, and strengthening the communication among your providers. The  can assist you with financial, behavioral, psychosocial, chemical dependency, counseling, and/or psychiatric resources.    Please feel free to contact me at [phone] with any questions or concerns. We at [facility] are focused on providing you with the highest-quality healthcare experience possible and that all starts with you.     Sincerely,     Queta Salas, JOVANW, MSW    Clinical Care Coordination  [facility]  [phone]

## 2019-10-18 NOTE — PROGRESS NOTES
Clinic Care Coordination Contact  [facility]     Referral Source: ED follow up  Clinical Data: Care Coordinator Outreach.  Referral for alcohol use and resources. Patient at [facility] ED yesterday for alcohol use.  Per ED report, Patient reported his roommate was upset as he was drinking and not keeping his room clean.  Patient reported he drinks she he can walk as has pain in legs and back.  He reports no recent falls or trauma.  Patient reported being sober until beginning of October noting his stressors as three children in [place] and having a low paying job.  He reported has been drinking beer every day for past 10-15 days.  He reported decreased appetite and nausea.  Patient reports bilateral leg pain, burning, present 90% of the time, extending from knees to toes.       SW attempted to call Patient via [place] .  No Tibetan interpreters were available at this time.  SW attempted to call Patient second time, no answer after several rings.       ED notes indicated that Patient was provided a Tibetan  but spoke English in the ED.       Plan: Care Coordinator will not make further attempt to contact Patient.  2 calls made and letter sent.  SW will update PCP and close.      Queta Salas, ANTONETTE, MSW   [facility]   [phone]  10/18/2019 9:39 AM

## 2019-11-27 ENCOUNTER — TRANSFERRED RECORDS (OUTPATIENT)
Dept: HEALTH INFORMATION MANAGEMENT | Facility: CLINIC | Age: 59
End: 2019-11-27

## 2019-11-27 LAB
CREAT SERPL-MCNC: 0.88 MG/DL (ref 0.72–1.25)
GFR SERPL CREATININE-BSD FRML MDRD: >60 ML/MIN/1.73M2
GLUCOSE SERPL-MCNC: 89 MG/DL (ref 65–100)
POTASSIUM SERPL-SCNC: 3.8 MMOL/L (ref 3.5–5)